# Patient Record
Sex: MALE | Race: WHITE | HISPANIC OR LATINO | Employment: OTHER | URBAN - METROPOLITAN AREA
[De-identification: names, ages, dates, MRNs, and addresses within clinical notes are randomized per-mention and may not be internally consistent; named-entity substitution may affect disease eponyms.]

---

## 2023-11-16 ENCOUNTER — HOSPITAL ENCOUNTER (INPATIENT)
Facility: MEDICAL CENTER | Age: 88
LOS: 2 days | DRG: 444 | End: 2023-11-18
Attending: HOSPITALIST | Admitting: HOSPITALIST
Payer: COMMERCIAL

## 2023-11-16 DIAGNOSIS — R09.02 HYPOXIA: ICD-10-CM

## 2023-11-16 DIAGNOSIS — K85.10 GALLSTONE PANCREATITIS: ICD-10-CM

## 2023-11-16 DIAGNOSIS — K80.50 CHOLEDOCHOLITHIASIS: ICD-10-CM

## 2023-11-16 DIAGNOSIS — J44.9 CHRONIC OBSTRUCTIVE PULMONARY DISEASE, UNSPECIFIED COPD TYPE (HCC): Primary | ICD-10-CM

## 2023-11-16 DIAGNOSIS — K81.0 ACUTE CHOLECYSTITIS: ICD-10-CM

## 2023-11-16 DIAGNOSIS — K81.0 CHOLECYSTITIS, ACUTE: ICD-10-CM

## 2023-11-16 PROCEDURE — 770001 HCHG ROOM/CARE - MED/SURG/GYN PRIV*

## 2023-11-16 PROCEDURE — 99223 1ST HOSP IP/OBS HIGH 75: CPT | Performed by: HOSPITALIST

## 2023-11-16 PROCEDURE — 700105 HCHG RX REV CODE 258: Performed by: HOSPITALIST

## 2023-11-16 PROCEDURE — 94760 N-INVAS EAR/PLS OXIMETRY 1: CPT

## 2023-11-16 PROCEDURE — 700111 HCHG RX REV CODE 636 W/ 250 OVERRIDE (IP): Mod: JZ | Performed by: HOSPITALIST

## 2023-11-16 RX ORDER — ATORVASTATIN CALCIUM 20 MG/1
20 TABLET, FILM COATED ORAL NIGHTLY
COMMUNITY

## 2023-11-16 RX ORDER — HEPARIN SODIUM 1000 [USP'U]/ML
40 INJECTION, SOLUTION INTRAVENOUS; SUBCUTANEOUS PRN
Status: DISCONTINUED | OUTPATIENT
Start: 2023-11-16 | End: 2023-11-17

## 2023-11-16 RX ORDER — ONDANSETRON 4 MG/1
4 TABLET, ORALLY DISINTEGRATING ORAL EVERY 4 HOURS PRN
Status: DISCONTINUED | OUTPATIENT
Start: 2023-11-16 | End: 2023-11-18 | Stop reason: HOSPADM

## 2023-11-16 RX ORDER — BISACODYL 10 MG
10 SUPPOSITORY, RECTAL RECTAL
Status: DISCONTINUED | OUTPATIENT
Start: 2023-11-16 | End: 2023-11-18 | Stop reason: HOSPADM

## 2023-11-16 RX ORDER — ACETAMINOPHEN 325 MG/1
650 TABLET ORAL EVERY 6 HOURS PRN
Status: DISCONTINUED | OUTPATIENT
Start: 2023-11-16 | End: 2023-11-16

## 2023-11-16 RX ORDER — ACETAMINOPHEN 325 MG/1
650 TABLET ORAL EVERY 6 HOURS PRN
Status: DISCONTINUED | OUTPATIENT
Start: 2023-11-16 | End: 2023-11-18 | Stop reason: HOSPADM

## 2023-11-16 RX ORDER — FLUTICASONE PROPIONATE AND SALMETEROL 250; 50 UG/1; UG/1
1 POWDER RESPIRATORY (INHALATION) 2 TIMES DAILY
COMMUNITY

## 2023-11-16 RX ORDER — HYDROMORPHONE HYDROCHLORIDE 1 MG/ML
1 INJECTION, SOLUTION INTRAMUSCULAR; INTRAVENOUS; SUBCUTANEOUS
Status: DISCONTINUED | OUTPATIENT
Start: 2023-11-16 | End: 2023-11-17

## 2023-11-16 RX ORDER — ONDANSETRON 2 MG/ML
4 INJECTION INTRAMUSCULAR; INTRAVENOUS EVERY 4 HOURS PRN
Status: DISCONTINUED | OUTPATIENT
Start: 2023-11-16 | End: 2023-11-16

## 2023-11-16 RX ORDER — HYDROMORPHONE HYDROCHLORIDE 1 MG/ML
0.5 INJECTION, SOLUTION INTRAMUSCULAR; INTRAVENOUS; SUBCUTANEOUS
Status: DISCONTINUED | OUTPATIENT
Start: 2023-11-16 | End: 2023-11-17

## 2023-11-16 RX ORDER — AMOXICILLIN 250 MG
2 CAPSULE ORAL 2 TIMES DAILY
Status: DISCONTINUED | OUTPATIENT
Start: 2023-11-16 | End: 2023-11-18 | Stop reason: HOSPADM

## 2023-11-16 RX ORDER — ENALAPRILAT 1.25 MG/ML
1.25 INJECTION INTRAVENOUS EVERY 6 HOURS PRN
Status: ACTIVE | OUTPATIENT
Start: 2023-11-16 | End: 2023-11-16

## 2023-11-16 RX ORDER — SODIUM CHLORIDE 9 MG/ML
INJECTION, SOLUTION INTRAVENOUS ONCE
Status: COMPLETED | OUTPATIENT
Start: 2023-11-16 | End: 2023-11-16

## 2023-11-16 RX ORDER — ONDANSETRON 2 MG/ML
4 INJECTION INTRAMUSCULAR; INTRAVENOUS EVERY 4 HOURS PRN
Status: DISCONTINUED | OUTPATIENT
Start: 2023-11-16 | End: 2023-11-18 | Stop reason: HOSPADM

## 2023-11-16 RX ORDER — POLYETHYLENE GLYCOL 3350 17 G/17G
1 POWDER, FOR SOLUTION ORAL
Status: DISCONTINUED | OUTPATIENT
Start: 2023-11-16 | End: 2023-11-18 | Stop reason: HOSPADM

## 2023-11-16 RX ORDER — OMEPRAZOLE 20 MG/1
20 CAPSULE, DELAYED RELEASE ORAL 2 TIMES DAILY
Status: ON HOLD | COMMUNITY
End: 2023-11-26 | Stop reason: SDUPTHER

## 2023-11-16 RX ORDER — HEPARIN SODIUM 5000 [USP'U]/100ML
0-30 INJECTION, SOLUTION INTRAVENOUS CONTINUOUS
Status: DISCONTINUED | OUTPATIENT
Start: 2023-11-16 | End: 2023-11-17

## 2023-11-16 RX ORDER — ALBUTEROL SULFATE 90 UG/1
2 AEROSOL, METERED RESPIRATORY (INHALATION)
Status: DISCONTINUED | OUTPATIENT
Start: 2023-11-16 | End: 2023-11-18 | Stop reason: HOSPADM

## 2023-11-16 RX ADMIN — PIPERACILLIN AND TAZOBACTAM 3.38 G: 3; .375 INJECTION, POWDER, FOR SOLUTION INTRAVENOUS at 23:52

## 2023-11-16 RX ADMIN — SODIUM CHLORIDE: 9 INJECTION, SOLUTION INTRAVENOUS at 23:44

## 2023-11-16 ASSESSMENT — COGNITIVE AND FUNCTIONAL STATUS - GENERAL
MOBILITY SCORE: 22
SUGGESTED CMS G CODE MODIFIER DAILY ACTIVITY: CI
CLIMB 3 TO 5 STEPS WITH RAILING: A LITTLE
MOBILITY SCORE: 22
SUGGESTED CMS G CODE MODIFIER MOBILITY: CJ
DAILY ACTIVITIY SCORE: 23
CLIMB 3 TO 5 STEPS WITH RAILING: A LITTLE
HELP NEEDED FOR BATHING: A LITTLE
WALKING IN HOSPITAL ROOM: A LITTLE
SUGGESTED CMS G CODE MODIFIER MOBILITY: CJ
WALKING IN HOSPITAL ROOM: A LITTLE

## 2023-11-16 ASSESSMENT — LIFESTYLE VARIABLES
EVER HAD A DRINK FIRST THING IN THE MORNING TO STEADY YOUR NERVES TO GET RID OF A HANGOVER: NO
TOTAL SCORE: 0
EVER FELT BAD OR GUILTY ABOUT YOUR DRINKING: NO
HOW MANY TIMES IN THE PAST YEAR HAVE YOU HAD 5 OR MORE DRINKS IN A DAY: 0
AVERAGE NUMBER OF DAYS PER WEEK YOU HAVE A DRINK CONTAINING ALCOHOL: 0
HAVE PEOPLE ANNOYED YOU BY CRITICIZING YOUR DRINKING: NO
ALCOHOL_USE: NO
HAVE YOU EVER FELT YOU SHOULD CUT DOWN ON YOUR DRINKING: NO
ON A TYPICAL DAY WHEN YOU DRINK ALCOHOL HOW MANY DRINKS DO YOU HAVE: 0
TOTAL SCORE: 0
CONSUMPTION TOTAL: NEGATIVE
TOTAL SCORE: 0

## 2023-11-16 ASSESSMENT — PATIENT HEALTH QUESTIONNAIRE - PHQ9
2. FEELING DOWN, DEPRESSED, IRRITABLE, OR HOPELESS: NOT AT ALL
1. LITTLE INTEREST OR PLEASURE IN DOING THINGS: NOT AT ALL
SUM OF ALL RESPONSES TO PHQ9 QUESTIONS 1 AND 2: 0
SUM OF ALL RESPONSES TO PHQ9 QUESTIONS 1 AND 2: 0
2. FEELING DOWN, DEPRESSED, IRRITABLE, OR HOPELESS: NOT AT ALL
1. LITTLE INTEREST OR PLEASURE IN DOING THINGS: NOT AT ALL

## 2023-11-16 ASSESSMENT — ENCOUNTER SYMPTOMS
INSOMNIA: 0
VOMITING: 0
FEVER: 0
PALPITATIONS: 0
DEPRESSION: 0
MYALGIAS: 0
SORE THROAT: 0
WEAKNESS: 0
NECK PAIN: 0
BLURRED VISION: 0
NAUSEA: 0
SHORTNESS OF BREATH: 0
BRUISES/BLEEDS EASILY: 0
DIZZINESS: 0
DOUBLE VISION: 0
HEADACHES: 0
COUGH: 0
ABDOMINAL PAIN: 1

## 2023-11-16 ASSESSMENT — COPD QUESTIONNAIRES
DURING THE PAST 4 WEEKS HOW MUCH DID YOU FEEL SHORT OF BREATH: NONE/LITTLE OF THE TIME
HAVE YOU SMOKED AT LEAST 100 CIGARETTES IN YOUR ENTIRE LIFE: NO/DON'T KNOW
COPD SCREENING SCORE: 2
DO YOU EVER COUGH UP ANY MUCUS OR PHLEGM?: NO/ONLY WITH OCCASIONAL COLDS OR INFECTIONS

## 2023-11-16 ASSESSMENT — PAIN DESCRIPTION - PAIN TYPE: TYPE: ACUTE PAIN

## 2023-11-17 ENCOUNTER — APPOINTMENT (OUTPATIENT)
Dept: RADIOLOGY | Facility: MEDICAL CENTER | Age: 88
DRG: 444 | End: 2023-11-17
Attending: INTERNAL MEDICINE
Payer: COMMERCIAL

## 2023-11-17 ENCOUNTER — ANESTHESIA (OUTPATIENT)
Dept: SURGERY | Facility: MEDICAL CENTER | Age: 88
DRG: 444 | End: 2023-11-17
Payer: COMMERCIAL

## 2023-11-17 ENCOUNTER — ANESTHESIA EVENT (OUTPATIENT)
Dept: SURGERY | Facility: MEDICAL CENTER | Age: 88
DRG: 444 | End: 2023-11-17
Payer: COMMERCIAL

## 2023-11-17 PROBLEM — J44.9 COPD (CHRONIC OBSTRUCTIVE PULMONARY DISEASE) (HCC): Status: ACTIVE | Noted: 2023-11-17

## 2023-11-17 PROBLEM — K80.50 CHOLEDOCHOLITHIASIS: Status: ACTIVE | Noted: 2023-11-17

## 2023-11-17 PROBLEM — K85.10 GALLSTONE PANCREATITIS: Status: ACTIVE | Noted: 2023-11-17

## 2023-11-17 LAB
ALBUMIN SERPL BCP-MCNC: 3.3 G/DL (ref 3.2–4.9)
ALBUMIN/GLOB SERPL: 1.3 G/DL
ALP SERPL-CCNC: 115 U/L (ref 30–99)
ALT SERPL-CCNC: 60 U/L (ref 2–50)
ANION GAP SERPL CALC-SCNC: 9 MMOL/L (ref 7–16)
APTT PPP: 31.3 SEC (ref 24.7–36)
APTT PPP: 37.7 SEC (ref 24.7–36)
APTT PPP: 87.6 SEC (ref 24.7–36)
AST SERPL-CCNC: 71 U/L (ref 12–45)
BILIRUB SERPL-MCNC: 1.6 MG/DL (ref 0.1–1.5)
BUN SERPL-MCNC: 15 MG/DL (ref 8–22)
CALCIUM ALBUM COR SERPL-MCNC: 9.1 MG/DL (ref 8.5–10.5)
CALCIUM SERPL-MCNC: 8.5 MG/DL (ref 8.4–10.2)
CHLORIDE SERPL-SCNC: 106 MMOL/L (ref 96–112)
CO2 SERPL-SCNC: 24 MMOL/L (ref 20–33)
CREAT SERPL-MCNC: 0.79 MG/DL (ref 0.5–1.4)
ERYTHROCYTE [DISTWIDTH] IN BLOOD BY AUTOMATED COUNT: 52.4 FL (ref 35.9–50)
GFR SERPLBLD CREATININE-BSD FMLA CKD-EPI: 83 ML/MIN/1.73 M 2
GLOBULIN SER CALC-MCNC: 2.5 G/DL (ref 1.9–3.5)
GLUCOSE SERPL-MCNC: 91 MG/DL (ref 65–99)
HCT VFR BLD AUTO: 38.5 % (ref 42–52)
HGB BLD-MCNC: 12.7 G/DL (ref 14–18)
INR PPP: 1.24 (ref 0.87–1.13)
LIPASE SERPL-CCNC: 23 U/L (ref 11–82)
LIPASE SERPL-CCNC: 29 U/L (ref 11–82)
LIPASE SERPL-CCNC: 58 U/L (ref 11–82)
MCH RBC QN AUTO: 32 PG (ref 27–33)
MCHC RBC AUTO-ENTMCNC: 33 G/DL (ref 32.3–36.5)
MCV RBC AUTO: 97 FL (ref 81.4–97.8)
NT-PROBNP SERPL IA-MCNC: 1023 PG/ML (ref 0–125)
NT-PROBNP SERPL IA-MCNC: 1028 PG/ML (ref 0–125)
PLATELET # BLD AUTO: 210 K/UL (ref 164–446)
PMV BLD AUTO: 9.6 FL (ref 9–12.9)
POTASSIUM SERPL-SCNC: 3.8 MMOL/L (ref 3.6–5.5)
PROT SERPL-MCNC: 5.8 G/DL (ref 6–8.2)
PROTHROMBIN TIME: 16.2 SEC (ref 12–14.6)
RBC # BLD AUTO: 3.97 M/UL (ref 4.7–6.1)
SODIUM SERPL-SCNC: 139 MMOL/L (ref 135–145)
TROPONIN T SERPL-MCNC: 20 NG/L (ref 6–19)
TROPONIN T SERPL-MCNC: 20 NG/L (ref 6–19)
TROPONIN T SERPL-MCNC: 21 NG/L (ref 6–19)
UFH PPP CHRO-ACNC: >1.1 IU/ML
WBC # BLD AUTO: 9.6 K/UL (ref 4.8–10.8)

## 2023-11-17 PROCEDURE — 36415 COLL VENOUS BLD VENIPUNCTURE: CPT

## 2023-11-17 PROCEDURE — 94640 AIRWAY INHALATION TREATMENT: CPT

## 2023-11-17 PROCEDURE — 700102 HCHG RX REV CODE 250 W/ 637 OVERRIDE(OP): Performed by: HOSPITALIST

## 2023-11-17 PROCEDURE — C1769 GUIDE WIRE: HCPCS | Performed by: INTERNAL MEDICINE

## 2023-11-17 PROCEDURE — 160009 HCHG ANES TIME/MIN: Performed by: INTERNAL MEDICINE

## 2023-11-17 PROCEDURE — 700105 HCHG RX REV CODE 258: Performed by: HOSPITALIST

## 2023-11-17 PROCEDURE — 700105 HCHG RX REV CODE 258: Performed by: INTERNAL MEDICINE

## 2023-11-17 PROCEDURE — 700101 HCHG RX REV CODE 250: Performed by: STUDENT IN AN ORGANIZED HEALTH CARE EDUCATION/TRAINING PROGRAM

## 2023-11-17 PROCEDURE — 110371 HCHG SHELL REV 272: Performed by: INTERNAL MEDICINE

## 2023-11-17 PROCEDURE — 0FC98ZZ EXTIRPATION OF MATTER FROM COMMON BILE DUCT, VIA NATURAL OR ARTIFICIAL OPENING ENDOSCOPIC: ICD-10-PCS | Performed by: INTERNAL MEDICINE

## 2023-11-17 PROCEDURE — 83690 ASSAY OF LIPASE: CPT | Mod: 91

## 2023-11-17 PROCEDURE — 700111 HCHG RX REV CODE 636 W/ 250 OVERRIDE (IP): Performed by: STUDENT IN AN ORGANIZED HEALTH CARE EDUCATION/TRAINING PROGRAM

## 2023-11-17 PROCEDURE — 160203 HCHG ENDO MINUTES - 1ST 30 MINS LEVEL 4: Performed by: INTERNAL MEDICINE

## 2023-11-17 PROCEDURE — 94760 N-INVAS EAR/PLS OXIMETRY 1: CPT

## 2023-11-17 PROCEDURE — 85730 THROMBOPLASTIN TIME PARTIAL: CPT | Mod: 91

## 2023-11-17 PROCEDURE — 700111 HCHG RX REV CODE 636 W/ 250 OVERRIDE (IP): Mod: JZ | Performed by: HOSPITALIST

## 2023-11-17 PROCEDURE — 160035 HCHG PACU - 1ST 60 MINS PHASE I: Performed by: INTERNAL MEDICINE

## 2023-11-17 PROCEDURE — 160002 HCHG RECOVERY MINUTES (STAT): Performed by: INTERNAL MEDICINE

## 2023-11-17 PROCEDURE — 85610 PROTHROMBIN TIME: CPT

## 2023-11-17 PROCEDURE — 80053 COMPREHEN METABOLIC PANEL: CPT

## 2023-11-17 PROCEDURE — A9270 NON-COVERED ITEM OR SERVICE: HCPCS | Performed by: HOSPITALIST

## 2023-11-17 PROCEDURE — 160048 HCHG OR STATISTICAL LEVEL 1-5: Performed by: INTERNAL MEDICINE

## 2023-11-17 PROCEDURE — 99233 SBSQ HOSP IP/OBS HIGH 50: CPT | Performed by: INTERNAL MEDICINE

## 2023-11-17 PROCEDURE — 85027 COMPLETE CBC AUTOMATED: CPT

## 2023-11-17 PROCEDURE — 83880 ASSAY OF NATRIURETIC PEPTIDE: CPT | Mod: 91

## 2023-11-17 PROCEDURE — 85520 HEPARIN ASSAY: CPT

## 2023-11-17 PROCEDURE — 0F798ZZ DILATION OF COMMON BILE DUCT, VIA NATURAL OR ARTIFICIAL OPENING ENDOSCOPIC: ICD-10-PCS | Performed by: INTERNAL MEDICINE

## 2023-11-17 PROCEDURE — 84484 ASSAY OF TROPONIN QUANT: CPT | Mod: 91

## 2023-11-17 PROCEDURE — 770020 HCHG ROOM/CARE - TELE (206)

## 2023-11-17 PROCEDURE — 160208 HCHG ENDO MINUTES - EA ADDL 1 MIN LEVEL 4: Performed by: INTERNAL MEDICINE

## 2023-11-17 RX ORDER — HEPARIN SODIUM 5000 [USP'U]/100ML
INJECTION, SOLUTION INTRAVENOUS CONTINUOUS
Status: DISCONTINUED | OUTPATIENT
Start: 2023-11-17 | End: 2023-11-18

## 2023-11-17 RX ORDER — ROCURONIUM BROMIDE 10 MG/ML
INJECTION, SOLUTION INTRAVENOUS PRN
Status: DISCONTINUED | OUTPATIENT
Start: 2023-11-17 | End: 2023-11-17 | Stop reason: SURG

## 2023-11-17 RX ORDER — HYDRALAZINE HYDROCHLORIDE 20 MG/ML
5 INJECTION INTRAMUSCULAR; INTRAVENOUS ONCE
Status: DISCONTINUED | OUTPATIENT
Start: 2023-11-17 | End: 2023-11-17 | Stop reason: HOSPADM

## 2023-11-17 RX ORDER — SODIUM CHLORIDE, SODIUM LACTATE, POTASSIUM CHLORIDE, CALCIUM CHLORIDE 600; 310; 30; 20 MG/100ML; MG/100ML; MG/100ML; MG/100ML
INJECTION, SOLUTION INTRAVENOUS CONTINUOUS
Status: DISCONTINUED | OUTPATIENT
Start: 2023-11-17 | End: 2023-11-17 | Stop reason: HOSPADM

## 2023-11-17 RX ORDER — HALOPERIDOL 5 MG/ML
1 INJECTION INTRAMUSCULAR
Status: DISCONTINUED | OUTPATIENT
Start: 2023-11-17 | End: 2023-11-17 | Stop reason: HOSPADM

## 2023-11-17 RX ORDER — HEPARIN SODIUM 1000 [USP'U]/ML
2600 INJECTION, SOLUTION INTRAVENOUS; SUBCUTANEOUS PRN
Status: DISCONTINUED | OUTPATIENT
Start: 2023-11-17 | End: 2023-11-18

## 2023-11-17 RX ORDER — ONDANSETRON 2 MG/ML
4 INJECTION INTRAMUSCULAR; INTRAVENOUS
Status: DISCONTINUED | OUTPATIENT
Start: 2023-11-17 | End: 2023-11-17 | Stop reason: HOSPADM

## 2023-11-17 RX ORDER — IPRATROPIUM BROMIDE 17 UG/1
2 AEROSOL, METERED RESPIRATORY (INHALATION) EVERY 6 HOURS PRN
COMMUNITY

## 2023-11-17 RX ORDER — LIDOCAINE HYDROCHLORIDE 20 MG/ML
INJECTION, SOLUTION EPIDURAL; INFILTRATION; INTRACAUDAL; PERINEURAL PRN
Status: DISCONTINUED | OUTPATIENT
Start: 2023-11-17 | End: 2023-11-17 | Stop reason: SURG

## 2023-11-17 RX ORDER — MORPHINE SULFATE 4 MG/ML
4 INJECTION INTRAVENOUS
Status: DISCONTINUED | OUTPATIENT
Start: 2023-11-17 | End: 2023-11-18 | Stop reason: HOSPADM

## 2023-11-17 RX ORDER — MORPHINE SULFATE 4 MG/ML
2 INJECTION INTRAVENOUS
Status: DISCONTINUED | OUTPATIENT
Start: 2023-11-17 | End: 2023-11-18 | Stop reason: HOSPADM

## 2023-11-17 RX ORDER — SODIUM CHLORIDE, SODIUM LACTATE, POTASSIUM CHLORIDE, CALCIUM CHLORIDE 600; 310; 30; 20 MG/100ML; MG/100ML; MG/100ML; MG/100ML
INJECTION, SOLUTION INTRAVENOUS CONTINUOUS
Status: ACTIVE | OUTPATIENT
Start: 2023-11-17 | End: 2023-11-17

## 2023-11-17 RX ADMIN — SUGAMMADEX 200 MG: 100 INJECTION, SOLUTION INTRAVENOUS at 17:39

## 2023-11-17 RX ADMIN — MOMETASONE FUROATE AND FORMOTEROL FUMARATE DIHYDRATE 2 PUFF: 100; 5 AEROSOL RESPIRATORY (INHALATION) at 10:27

## 2023-11-17 RX ADMIN — LIDOCAINE HYDROCHLORIDE 60 MG: 20 INJECTION, SOLUTION EPIDURAL; INFILTRATION; INTRACAUDAL at 16:59

## 2023-11-17 RX ADMIN — PIPERACILLIN AND TAZOBACTAM 3.38 G: 3; .375 INJECTION, POWDER, FOR SOLUTION INTRAVENOUS at 20:45

## 2023-11-17 RX ADMIN — SODIUM CHLORIDE, POTASSIUM CHLORIDE, SODIUM LACTATE AND CALCIUM CHLORIDE: 600; 310; 30; 20 INJECTION, SOLUTION INTRAVENOUS at 16:54

## 2023-11-17 RX ADMIN — PIPERACILLIN AND TAZOBACTAM 3.38 G: 3; .375 INJECTION, POWDER, FOR SOLUTION INTRAVENOUS at 03:12

## 2023-11-17 RX ADMIN — MOMETASONE FUROATE AND FORMOTEROL FUMARATE DIHYDRATE 2 PUFF: 100; 5 AEROSOL RESPIRATORY (INHALATION) at 19:23

## 2023-11-17 RX ADMIN — HYDROMORPHONE HYDROCHLORIDE 1 MG: 1 INJECTION, SOLUTION INTRAMUSCULAR; INTRAVENOUS; SUBCUTANEOUS at 09:51

## 2023-11-17 RX ADMIN — ROCURONIUM BROMIDE 40 MG: 50 INJECTION, SOLUTION INTRAVENOUS at 17:00

## 2023-11-17 RX ADMIN — PIPERACILLIN AND TAZOBACTAM 3.38 G: 3; .375 INJECTION, POWDER, FOR SOLUTION INTRAVENOUS at 09:55

## 2023-11-17 RX ADMIN — HEPARIN SODIUM 1050 UNITS/HR: 5000 INJECTION, SOLUTION INTRAVENOUS at 01:39

## 2023-11-17 RX ADMIN — PROPOFOL 70 MG: 10 INJECTION, EMULSION INTRAVENOUS at 17:00

## 2023-11-17 ASSESSMENT — PAIN DESCRIPTION - PAIN TYPE
TYPE: SURGICAL PAIN
TYPE: ACUTE PAIN
TYPE: ACUTE PAIN;SURGICAL PAIN
TYPE: ACUTE PAIN

## 2023-11-17 ASSESSMENT — ENCOUNTER SYMPTOMS
WEAKNESS: 1
RESPIRATORY NEGATIVE: 1
CHILLS: 1
EYES NEGATIVE: 1
MUSCULOSKELETAL NEGATIVE: 1
GASTROINTESTINAL NEGATIVE: 1
PSYCHIATRIC NEGATIVE: 1
NEUROLOGICAL NEGATIVE: 1
NAUSEA: 1
CARDIOVASCULAR NEGATIVE: 1
CONSTITUTIONAL NEGATIVE: 1
VOMITING: 1
ABDOMINAL PAIN: 1

## 2023-11-17 ASSESSMENT — FIBROSIS 4 INDEX: FIB4 SCORE: 4.02

## 2023-11-17 NOTE — H&P
Hospital Medicine History & Physical Note    Date of Service  11/16/2023    Primary Care Physician  No primary care provider on file.    Consultants  GI    Specialist Names: I personally discussed this case with Dr. Wright.    Code Status  Full Code    Chief Complaint  Direct Admission from [facility] for  Acute Choledocholithiasis and outside facility MRCP showing multiple  Bile Duct Stones. Transferred for need of GI consult and possible ERCP.     History of Presenting Illness  Lewis Farooq is a 89 y.o. male, h/o PE 2 months ago, on Eliquis,  who is coming as a Direct Admission from [facility] for  Acute Choledocholithiasis and outside facility MRCP showing multiple  Bile Duct Stones. Transferred for need of GI consult and possible ERCP on 11/16/2023.      Additionally, patient was hypoxic and is now on 2 L of supplemental oxygen via nasal cannula.  He has history of COPD, but denies wheezing.  He also denies cough or fever.  Congestive heart failure (unknown EF), BPH.    Patient reports his abdominal pain started earlier this morning.  Pain is on the right upper quadrant and associated with 6 episodes of nonbloody emesis.      Medications/ therapy given prior transfer:  Zosyn 3.375 g IV (15:57)  LR 1 L   Zofran 4 mg  Fentanyl 25 mcg IV    I discussed the plan of care with patient and received signed out from Hospitalist who accepted patient, Dr. Harvey and GI, Dr. Wright.  .    Review of Systems  Review of Systems   Constitutional:  Positive for malaise/fatigue. Negative for fever.   HENT:  Negative for congestion and sore throat.    Eyes:  Negative for blurred vision and double vision.   Respiratory:  Negative for cough and shortness of breath.    Cardiovascular:  Negative for chest pain and palpitations.   Gastrointestinal:  Positive for abdominal pain. Negative for nausea and vomiting.   Genitourinary:  Negative for dysuria and urgency.   Musculoskeletal:  Negative for  myalgias and neck pain.   Skin:  Negative for itching and rash.   Neurological:  Negative for dizziness, weakness and headaches.   Endo/Heme/Allergies:  Does not bruise/bleed easily.   Psychiatric/Behavioral:  Negative for depression. The patient does not have insomnia.        Past Medical History  COPD, congestive heart failure, BPH and recently, couple of months ago diagnosed with PE and on Eliquis    Surgical History  He denies prior surgical history.    Family History  Reviewed and not pertinent  Family history reviewed with patient. There is no family history that is pertinent to the chief complaint.     Social History     Patient denies smoking.  He also denies daily drink of alcohol and recreational drug use.    Allergies  NKDA    Medications  None   Eliquis 5 mg BID  Singulair  Tamsulosine  Omeprazole  Advair  Lasix 29 mg PRN      Physical Exam  Temp:  [36.5 °C (97.7 °F)] 36.5 °C (97.7 °F)  Pulse:  [57] 57  Resp:  [17] 17  BP: (135)/(56) 135/56  SpO2:  [94 %] 94 %  Blood Pressure : 135/56   Temperature: 36.5 °C (97.7 °F)   Pulse: (!) 57   Respiration: 17   Pulse Oximetry: 94 %       Physical Exam  Constitutional:       Appearance: Normal appearance.   HENT:      Head: Normocephalic and atraumatic.      Nose: Nose normal.      Mouth/Throat:      Mouth: Mucous membranes are moist.      Pharynx: Oropharynx is clear.   Eyes:      Extraocular Movements: Extraocular movements intact.      Pupils: Pupils are equal, round, and reactive to light.   Cardiovascular:      Rate and Rhythm: Normal rate and regular rhythm.      Pulses: Normal pulses.      Heart sounds: Normal heart sounds.   Pulmonary:      Effort: Pulmonary effort is normal.      Breath sounds: Normal breath sounds.   Abdominal:      General: Abdomen is flat. Bowel sounds are normal.      Palpations: Abdomen is soft.   Musculoskeletal:      Cervical back: Normal range of motion and neck supple.   Skin:     General: Skin is warm and dry.   Neurological:       General: No focal deficit present.      Mental Status: He is alert and oriented to person, place, and time.   Psychiatric:         Mood and Affect: Mood normal.         Behavior: Behavior normal.         Laboratory:      Outside Facility Laboratory:   CMP: Na 137, K 4.0, BUN 28, Creatinine 0.89, , ALT 57, Total Bilirubin 2.3  CBC: WBC 10.3, Hgb 14.5, Platelets 238  BNP 84  Lipase 785  Troponin 9.6  COVID PCR: Negative.     Imaging:  No orders to display     CT Chest/ Abd/ Pelvis  Impression:  Sludge versus layering small non radiodense stone in the gallbladder. Ultrasound may be helpful to confirm or exclude.  No Biliary Ductal Dilation or evidence of choledocholithiasis  3. Enlarged Pulmonary Artery and prominent right ventricle, suspect for  pulmonary Hypertension  4. Mild mosaic pattern of the lungs, suspect trapping  5. Stable semisolid nodule right middle lobe    MRCP:  Impression:  1.Distended gallbladder containing small burden dependent sludge and mild dilation of common bile duct of 10 mm burden dependent  also demonstrating small depemdemt stones in the mid to distal CBD measuring 3mm to 4 mm in size.  2. Negative for obvious pancreatic phlegmon, peripancreatic inflammatory change or pseudocyst.     X-Ray:     EKG:  I have personally reviewed the images and compared with prior images. and My impression is: (outside facility EKG done earlier today): Sinus rhythm at 85 bpm, with RBBB and frequent PVC's. No acute ST elevation or depression.     Assessment/Plan:  Justification for Admission Status  I anticipate this patient will require at least two midnights for appropriate medical management, necessitating inpatient admission because 89-year-old male, coming with acute choledocholithiasis.  He has history of PE and is on Eliquis.  In need of GI evaluation and possible ERCP.      * Choledocholithiasis  Assessment & Plan  -Inpatient status on medical floor with remote cardiac monitoring.  -Patient is  coming as a direct admission from [facility].  Acute onset of abdominal pain and nonbloody emesis.  -CT showing gallbladder sludge but no biliary duct dilation.  -MRCP demonstrated distended gallbladder containing small burden dependent sludge and mild common bile duct dilation of 10 mm and stones to the mid to distal common bile duct measuring 3 to 4 mm.  -Given concern of possible component of acute cholecystitis patient was also started on Zosyn which we will continue.  -He has elevated LFTs and total bilirubin of 2.3 with also underlying pancreatitis and initial lipase of 785.  -Strict n.p.o. with low IV fluid hydration until I can fully assess EF.  -I appreciate GI consult and recommendations.  I discussed the case with Dr. Wright.    Hypoxia  Assessment & Plan  -Hypoxic to 84 % at outside facility, now on 2 L O2 via NC.   -Underlying history of COPD but not wheezing at this time.  -Recent history of PE on Eliquis and compliant with medication.  Noticed that CT chest  -Showed enlarged pulmonary artery and prominent right ventricle suspect for pulmonary hypertension.  -COVID PCR negative at outside facility.  -Patient will need to likely need procedure.  Given higher risk for surgery and possibly decompensating underlying cardiopulmonary process, adding echocardiogram to further evaluate and obtain an EF.  -Troponin and proBNP negative at outside facility but I will repeat.  Patient is not currently having any chest pain.  EKG showing right bundle branch block and frequent PVCs but no acute ischemia or arrhythmias.  Monitor with remote cardiac unit at this point.  -I am starting him on heparin infusion given that Eliquis needs to be held for procedure.  I have discussed all of this with GI, Dr. Wright.    Gallstone pancreatitis  Assessment & Plan  -Lipase at outside facility was 785.  -Patient will be started on mild IV hydration with normal saline at 83 mL/h.  Avoid fluid overload as I still  do not know his EF but need to treat acute pancreatitis.  -Daily lipase added.  -Pain control and antiemetics as needed.  -MRCP was negative for obvious pancreatic acute findings.    COPD (chronic obstructive pulmonary disease) (HCC)  Assessment & Plan  -Without exacerbation.  -Continue Advair.        VTE prophylaxis: SCDs/TEDs

## 2023-11-17 NOTE — PROGRESS NOTES
[facility] Medicine Daily Progress Note    Date of Service  11/17/2023    Chief Complaint  Lewis Farooq is a 89 y.o. male transferred from [facility] on 11/16/2023 where he presented with acute right upper quadrant abdominal pain, nausea, and vomiting.     CT showing gallbladder sludge but no biliary duct dilation. MRCP showed distended gallbladder containing sludge and mild common bile duct dilation of 10 mm and stones to the mid to distal common bile duct measuring 3 to 4 mm.  He was started on Zosyn and transferred to [facility] for GI consult and possible ERCP.    He was diagnosed with pulmonary embolism 2 months ago and is on Eliquis.  He has a history of COPD, CHF (unknown EF), and BPH.    Hospital Course  Case was discussed with GI (Dr. Wright).     Interval Problem Update  Evaluated by GI.  Planned for ERCP today.  Lipase is 23. Afebrile and hemodynamically stable. Will consult General Surgery. Saturating 92% on 2 L/min nasal cannula.    I have discussed this patient's plan of care and discharge plan at IDT rounds today with Case Management, Nursing, Nursing leadership, and other members of the IDT team.    Consultants/Specialty  GI    Code Status  Full Code    Disposition  The patient is not medically cleared for discharge to home or a post-acute facility.  Anticipate discharge to: home with close outpatient follow-up    I have placed the appropriate orders for post-discharge needs.    Review of Systems  Review of Systems   Constitutional: Negative.    HENT: Negative.     Eyes: Negative.    Respiratory: Negative.     Cardiovascular: Negative.    Gastrointestinal: Negative.    Genitourinary: Negative.    Musculoskeletal: Negative.    Skin: Negative.    Neurological: Negative.    Endo/Heme/Allergies: Negative.    Psychiatric/Behavioral: Negative.          Physical Exam  Temp:  [36.2 °C (97.1 °F)-36.5 °C (97.7 °F)] 36.4 °C (97.5 °F)  Pulse:  [52-60] 60  Resp:  [15-17] 15  BP:  (132-147)/(56-65) 147/59  SpO2:  [90 %-94 %] 92 %    Physical Exam  Constitutional:       General: He is not in acute distress.  HENT:      Head: Normocephalic.      Nose: Nose normal.   Eyes:      Pupils: Pupils are equal, round, and reactive to light.   Cardiovascular:      Rate and Rhythm: Normal rate.   Pulmonary:      Effort: Pulmonary effort is normal.   Abdominal:      Palpations: Abdomen is soft.   Musculoskeletal:      Cervical back: Normal range of motion.      Right lower leg: No edema.      Left lower leg: No edema.   Skin:     General: Skin is warm.   Neurological:      General: No focal deficit present.      Mental Status: He is alert.   Psychiatric:         Mood and Affect: Mood normal.         Fluids    Intake/Output Summary (Last 24 hours) at 11/17/2023 1318  Last data filed at 11/17/2023 1300  Gross per 24 hour   Intake 0 ml   Output --   Net 0 ml       Laboratory  Recent Labs     11/17/23  0712   WBC 9.6   RBC 3.97*   HEMOGLOBIN 12.7*   HEMATOCRIT 38.5*   MCV 97.0   MCH 32.0   MCHC 33.0   RDW 52.4*   PLATELETCT 210   MPV 9.6     Recent Labs     11/17/23  0734   SODIUM 139   POTASSIUM 3.8   CHLORIDE 106   CO2 24   GLUCOSE 91   BUN 15   CREATININE 0.79   CALCIUM 8.5     Recent Labs     11/17/23  0009 11/17/23  0734   APTT 31.3 87.6*   INR 1.24*  --                Imaging  DX-PORTABLE FLUOROSCOPY < 1 HOUR    (Results Pending)        Assessment/Plan  * Choledocholithiasis  Assessment & Plan  Transferred from [facility] on 11/16/2023 where he presented with acute right upper quadrant abdominal pain, nausea, and vomiting. CT showing gallbladder sludge but no biliary duct dilation. MRCP showed distended gallbladder containing sludge and mild common bile duct dilation of 10 mm and stones to the mid to distal common bile duct measuring 3 to 4 mm. He was started on Zosyn and transferred to [facility] for GI consult/ERCP. Planned for ERCP today. Will consult General Surgery    Gallstone  pancreatitis  Assessment & Plan  Lipase was 785 at outside facility, improved to 23.  MRCP showed distended gallbladder containing sludge and mild common bile duct dilation of 10 mm and stones to the mid to distal common bile duct measuring 3 to 4 mm    COPD (chronic obstructive pulmonary disease) (HCC)  Assessment & Plan  Stable. Continue as needed inhalers.    Hypoxia  Assessment & Plan  Patient has a history of COPD.  Currently saturating 92% on 2 L/min nasal cannula.  Does not use oxygen at home.  He has a history of pulm embolism, and is on Eliquis.  Eliquis was switched to IV heparin on admission.  Patient will need outpatient pulmonology follow-up         VTE prophylaxis: SCDs

## 2023-11-17 NOTE — ASSESSMENT & PLAN NOTE
Transferred from [facility] on 11/16/2023 where he presented with acute right upper quadrant abdominal pain, nausea, and vomiting. CT showing gallbladder sludge but no biliary duct dilation. MRCP showed distended gallbladder containing sludge and mild common bile duct dilation of 10 mm and stones to the mid to distal common bile duct measuring 3 to 4 mm. He was started on Zosyn and transferred to [facility] for GI consult/ERCP. Planned for ERCP today. Will consult General Surgery

## 2023-11-17 NOTE — ASSESSMENT & PLAN NOTE
Patient has a history of COPD.  Currently saturating 92% on 2 L/min nasal cannula.  Does not use oxygen at home.  He has a history of pulm embolism, and is on Eliquis.  Eliquis was switched to IV heparin on admission.  Patient will need outpatient pulmonology follow-up

## 2023-11-17 NOTE — CARE PLAN
The patient is Stable - Low risk of patient condition declining or worsening    Shift Goals  Clinical Goals: Pt's pain will be tolerable at end of shift; maintain strict NPO; continue on IV fluids.  Patient Goals: Sleep and pain mgt.  Family Goals: Updates on POC.    Progress made toward(s) clinical / shift goals:    Pt's weight based heparin drip started; continued IV fluids and strict NPO; pt able to ambulate to bathroom multiple times hand held since pt refused walker; daughter at bedside the whole time. Bed alarms on.     Patient is not progressing towards the following goals:

## 2023-11-17 NOTE — CONSULTS
Gastroenterology Consult Note:    HAYDEN Crane  Date & Time note created:    11/17/2023   11:05 AM     Referring MD:  Dr. Howell    Patient ID:  Name:             Lewis Ivy     YOB: 1930  Age:                 89 y.o.  male   MRN:               [id number]                                                             Reason for Consult:      Epigastric abdominal pain  N/V    History of Present Illness:    This is a 89 yo Irish speaking male, his daughter, Gerda is the , [facility], recent PE started on Eliquis 10/2023 who was seen in consultation for sudden onset of epigastric/RUQ abdominal pain, multiple episodes of vomiting starting yesterday. Complaints of chills but no fever. He was seen at [facility] for these symptoms. He was found to be hypoxic O2 sats 84% now on 2 L of oxygen. Outside labs: TB: 2.3. Lipase: 785. MRCP: distended gallbladder containing small burden dependent sludge and mild common bile duct dilation of 10 mm with stones to the mid to distal common bile duct measuring 3-4 mm. Last dose of Eliquis was yesterday morning. Transferred to [facility] for possible ERCP. He was started on Zosyn IV and Heparin gtt. Repeat Labs this morning: AST: 71. ALT: 60. ALP: 115. TB: 1.6. Lipase: 29. INR: 1.24. NT-proBNP: 1028. Troponin: 21.     Review of Systems:      Review of Systems   Constitutional:  Positive for chills and malaise/fatigue.   HENT: Negative.     Eyes: Negative.    Respiratory: Negative.     Cardiovascular: Negative.    Gastrointestinal:  Positive for abdominal pain, nausea and vomiting.   Genitourinary: Negative.    Musculoskeletal: Negative.    Skin: Negative.    Neurological:  Positive for weakness.   Psychiatric/Behavioral: Negative.               Physical Exam:  Vitals/ General Appearance:   Weight/BMI: Body mass index is 24.86 kg/m².    Vitals:    11/17/23 0436 11/17/23 0910 11/17/23 1000 11/17/23 1028   BP: (!) 145/63 (!) 146/65  139/62    Pulse: (!) 56 (!) 52 (!) 54 (!) 53   Resp: 17 15 16 17   Temp: 36.2 °C (97.1 °F) 36.2 °C (97.1 °F)     TempSrc: Temporal Temporal     SpO2: 92% 92% 91% 91%   Weight:       Height:         Oxygen Therapy:  Pulse Oximetry: 91 %, O2 (LPM): 2, O2 Delivery Device: Nasal Cannula    Physical Exam  Vitals reviewed.   Constitutional:       Appearance: He is obese. He is ill-appearing.   HENT:      Head: Normocephalic and atraumatic.      Mouth/Throat:      Mouth: Mucous membranes are dry.      Comments: Missing teeth  Eyes:      Extraocular Movements: Extraocular movements intact.      Pupils: Pupils are equal, round, and reactive to light.   Cardiovascular:      Rate and Rhythm: Regular rhythm. Bradycardia present.      Pulses: Normal pulses.      Heart sounds: Normal heart sounds.   Pulmonary:      Effort: Pulmonary effort is normal.      Breath sounds: Normal breath sounds.   Abdominal:      General: Bowel sounds are normal.      Palpations: Abdomen is soft.      Tenderness: There is abdominal tenderness. There is no guarding.   Musculoskeletal:         General: Normal range of motion.      Cervical back: Normal range of motion and neck supple.   Skin:     General: Skin is warm and dry.      Capillary Refill: Capillary refill takes 2 to 3 seconds.   Neurological:      General: No focal deficit present.      Mental Status: He is alert and oriented to person, place, and time.   Psychiatric:         Mood and Affect: Mood normal.         Behavior: Behavior normal.         Thought Content: Thought content normal.         Judgment: Judgment normal.         Past Medical History:   History reviewed. No pertinent past medical history.    Past Surgical History:  History reviewed. No pertinent surgical history.    Hospital Medications:    Current Facility-Administered Medications:     heparin injection 2,600 Units, 2,600 Units, Intravenous, PRN **AND** heparin infusion 25,000 Units in 500 mL 0.45% NACL, , Intravenous,  Continuous, Last Rate: 21 mL/hr at 11/17/23 0900, 1,050 Units/hr at 11/17/23 0900 **AND** Protocol 440 Heparin Weight Based DO NOT GIVE ANY HEPARIN BOLUS TO STROKE PATIENT, , , CONTINUOUS **AND** Protocol 440 Heparin Weight Based Discontinue Enoxaparin (Lovenox), Dabigatran (Pradaxa), Rivaroxaban (Xarelto), Apixaban (Eliquis), Edoxaban (Savaysa, Lixiana), Fondaparinux (Arixtra) and Argatroban prior to heparin administration, , , Once **AND** Protocol 440 Heparin Weight Based Draw baseline aPTT, PT, and platelet count if not already done, , , CONTINUOUS **AND** Protocol 440 Heparin Weight Based Draw aPTT 6 hours after beginning infusion. , , , CONTINUOUS **AND** Protocol 440 Heparin Weight Based Record Patient Data, , , CONTINUOUS **AND** Protocol 440 Heparin Weight Based INSTRUCTIONS, , , CONTINUOUS **AND** Protocol 440 Heparin Weight Based Review aPTT results 6 hours after infusion is begun as detailed, , , CONTINUOUS **AND** Protocol 440 Heparin Weight Based Draw Platelet count every three days. Contact MD if platelet is 50% lower than baseline count., , , CONTINUOUS **AND** Protocol 440 Heparin Weight Based Adjust heparin to maintain aPTT between 55-96 sec, , , CONTINUOUS **AND** Protocol 440 Heparin Weight Based Order aPTT 6 hours after any rate change or hold until aPTT is therapeutic (55-96 seconds), , , CONTINUOUS **AND** Protocol 440 Heparin Weight Based Documentation and verification, , , CONTINUOUS, Shayy Velarde M.D.    mometasone-formoterol (Dulera) 100-5 MCG/ACT inhaler 2 Puff, 2 Puff, Inhalation, BID (RT), Shayy Velarde M.D., 2 Puff at 11/17/23 1027    acetaminophen (Tylenol) tablet 650 mg, 650 mg, Oral, Q6HRS PRN, Shayy Velarde M.D.    ondansetron (Zofran) syringe/vial injection 4 mg, 4 mg, Intravenous, Q4HRS PRN, KAYLA RocaD.    ondansetron (Zofran ODT) dispertab 4 mg, 4 mg, Oral, Q4HRS PRN, Shayy Velarde M.D.    senna-docusate (Pericolace Or  Senokot S) 8.6-50 MG per tablet 2 Tablet, 2 Tablet, Oral, BID **AND** polyethylene glycol/lytes (Miralax) PACKET 1 Packet, 1 Packet, Oral, QDAY PRN **AND** magnesium hydroxide (Milk Of Magnesia) suspension 30 mL, 30 mL, Oral, QDAY PRN **AND** bisacodyl (Dulcolax) suppository 10 mg, 10 mg, Rectal, QDAY PRN, Shayy Velarde M.D.    [COMPLETED] piperacillin-tazobactam (Zosyn) 3.375 g in  mL IVPB, 3.375 g, Intravenous, Once, Stopped at 11/17/23 0022 **AND** piperacillin-tazobactam (Zosyn) 3.375 g in  mL IVPB, 3.375 g, Intravenous, Q8HRS, Shayy Velarde M.D., Last Rate: 25 mL/hr at 11/17/23 0955, 3.375 g at 11/17/23 0955    HYDROmorphone (Dilaudid) injection 0.5 mg, 0.5 mg, Intravenous, Q3HRS PRN, Shayy Velarde M.D.    HYDROmorphone (Dilaudid) injection 1 mg, 1 mg, Intravenous, Q3HRS PRN, Shayy Velarde M.D., 1 mg at 11/17/23 0951    albuterol inhaler 2 Puff, 2 Puff, Inhalation, Q4H PRN (RT), Kamaljit Harvey M.D.    Current Outpatient Medications:  Current Facility-Administered Medications   Medication Dose Route Frequency Provider Last Rate Last Admin    heparin injection 2,600 Units  2,600 Units Intravenous PRN Shayy Velarde M.D.        And    heparin infusion 25,000 Units in 500 mL 0.45% NACL   Intravenous Continuous Shayy Velarde M.D. 21 mL/hr at 11/17/23 0900 1,050 Units/hr at 11/17/23 0900    mometasone-formoterol (Dulera) 100-5 MCG/ACT inhaler 2 Puff  2 Puff Inhalation BID (RT) Shayy Velarde M.D.   2 Puff at 11/17/23 1027    acetaminophen (Tylenol) tablet 650 mg  650 mg Oral Q6HRS PRN Shayy Velarde M.D.        ondansetron (Zofran) syringe/vial injection 4 mg  4 mg Intravenous Q4HRS PRN Shayy Velarde M.D.        ondansetron (Zofran ODT) dispertab 4 mg  4 mg Oral Q4HRS PRN Shayy Velarde M.D.        senna-docusate (Pericolace Or Senokot S) 8.6-50 MG per tablet 2 Tablet  2 Tablet Oral BID Shayy Velarde  M.D.        And    polyethylene glycol/lytes (Miralax) PACKET 1 Packet  1 Packet Oral QDAY PRN Shayy Velarde M.D.        And    magnesium hydroxide (Milk Of Magnesia) suspension 30 mL  30 mL Oral QDAY PRN Shayy Velarde M.D.        And    bisacodyl (Dulcolax) suppository 10 mg  10 mg Rectal QDAY PRN Shayy Velarde M.D.        piperacillin-tazobactam (Zosyn) 3.375 g in  mL IVPB  3.375 g Intravenous Q8HRS Shayy Velarde M.D. 25 mL/hr at 11/17/23 0955 3.375 g at 11/17/23 0955    HYDROmorphone (Dilaudid) injection 0.5 mg  0.5 mg Intravenous Q3HRS PRN Shayy Velarde M.D.        HYDROmorphone (Dilaudid) injection 1 mg  1 mg Intravenous Q3HRS PRN Shayy Velarde M.D.   1 mg at 11/17/23 0951    albuterol inhaler 2 Puff  2 Puff Inhalation Q4H PRN (RT) Kamaljit Harvey M.D.           Medication Allergy:  No Known Allergies    Family History:  History reviewed. No pertinent family history.    Social History:  Social History     Socioeconomic History    Marital status: Not on file     Spouse name: Not on file    Number of children: Not on file    Years of education: Not on file    Highest education level: Not on file   Occupational History    Not on file   Tobacco Use    Smoking status: Not on file    Smokeless tobacco: Not on file   Substance and Sexual Activity    Alcohol use: Not on file    Drug use: Not on file    Sexual activity: Not on file   Other Topics Concern    Not on file   Social History Narrative    Not on file     Social Determinants of Health     Financial Resource Strain: Not on file   Food Insecurity: Not on file   Transportation Needs: Not on file   Physical Activity: Not on file   Stress: Not on file   Social Connections: Not on file   Intimate Partner Violence: Not on file   Housing Stability: Not on file         MDM (Data Review):     Records reviewed and summarized in current documentation    Lab Data Review:  Recent Results (from the past 24  hour(s))   aPTT    Collection Time: 11/17/23 12:09 AM   Result Value Ref Range    APTT 31.3 24.7 - 36.0 sec   Prothrombin Time    Collection Time: 11/17/23 12:09 AM   Result Value Ref Range    PT 16.2 (H) 12.0 - 14.6 sec    INR 1.24 (H) 0.87 - 1.13   Heparin Xa (Unfractionated)    Collection Time: 11/17/23 12:09 AM   Result Value Ref Range    Heparin Xa (UFH) >1.10 (HH) IU/mL   TROPONIN    Collection Time: 11/17/23 12:09 AM   Result Value Ref Range    Troponin T 20 (H) 6 - 19 ng/L   LIPASE    Collection Time: 11/17/23 12:09 AM   Result Value Ref Range    Lipase 58 11 - 82 U/L   proBrain Natriuretic Peptide, NT    Collection Time: 11/17/23 12:09 AM   Result Value Ref Range    NT-proBNP 1023 (H) 0 - 125 pg/mL   CBC without Differential    Collection Time: 11/17/23  7:12 AM   Result Value Ref Range    WBC 9.6 4.8 - 10.8 K/uL    RBC 3.97 (L) 4.70 - 6.10 M/uL    Hemoglobin 12.7 (L) 14.0 - 18.0 g/dL    Hematocrit 38.5 (L) 42.0 - 52.0 %    MCV 97.0 81.4 - 97.8 fL    MCH 32.0 27.0 - 33.0 pg    MCHC 33.0 32.3 - 36.5 g/dL    RDW 52.4 (H) 35.9 - 50.0 fL    Platelet Count 210 164 - 446 K/uL    MPV 9.6 9.0 - 12.9 fL   Comp Metabolic Panel (CMP)    Collection Time: 11/17/23  7:34 AM   Result Value Ref Range    Sodium 139 135 - 145 mmol/L    Potassium 3.8 3.6 - 5.5 mmol/L    Chloride 106 96 - 112 mmol/L    Co2 24 20 - 33 mmol/L    Anion Gap 9.0 7.0 - 16.0    Glucose 91 65 - 99 mg/dL    Bun 15 8 - 22 mg/dL    Creatinine 0.79 0.50 - 1.40 mg/dL    Calcium 8.5 8.4 - 10.2 mg/dL    Correct Calcium 9.1 8.5 - 10.5 mg/dL    AST(SGOT) 71 (H) 12 - 45 U/L    ALT(SGPT) 60 (H) 2 - 50 U/L    Alkaline Phosphatase 115 (H) 30 - 99 U/L    Total Bilirubin 1.6 (H) 0.1 - 1.5 mg/dL    Albumin 3.3 3.2 - 4.9 g/dL    Total Protein 5.8 (L) 6.0 - 8.2 g/dL    Globulin 2.5 1.9 - 3.5 g/dL    A-G Ratio 1.3 g/dL   TROPONIN    Collection Time: 11/17/23  7:34 AM   Result Value Ref Range    Troponin T 21 (H) 6 - 19 ng/L   proBrain Natriuretic Peptide, NT     Collection Time: 11/17/23  7:34 AM   Result Value Ref Range    NT-proBNP 1028 (H) 0 - 125 pg/mL   APTT    Collection Time: 11/17/23  7:34 AM   Result Value Ref Range    APTT 87.6 (H) 24.7 - 36.0 sec   LIPASE    Collection Time: 11/17/23  7:34 AM   Result Value Ref Range    Lipase 29 11 - 82 U/L   ESTIMATED GFR    Collection Time: 11/17/23  7:34 AM   Result Value Ref Range    GFR (CKD-EPI) 83 >60 mL/min/1.73 m 2       Imaging/Procedures Review:      DX-PORTABLE FLUOROSCOPY < 1 HOUR    (Results Pending)        MDM (Assessment and Plan):     Patient Active Problem List    Diagnosis Date Noted    COPD (chronic obstructive pulmonary disease) (HCC) 11/17/2023    Gallstone pancreatitis 11/17/2023    Choledocholithiasis 11/17/2023    Acute cholecystitis 11/16/2023    Cholecystitis, acute 11/16/2023    Hypoxia 11/16/2023     This is a very pleasant Hopi 89 yo Swedish speaking male who was admitted to the hospital with a sudden onset of epigastric/RUQ abdominal pain, multiple episodes of non bloody emesis. Labs revealed elevated liver enzymes, acute pancreatitis. MRCP showed biliary stones with bile duct 10 mm. Recently diagnosed with PE started on Eliquis. Last dose was yesterday morning. Started on Heparin gtt IV and Zosyn IV.    ASSESSMENT:  Abdominal pain  Elevated liver enzymes  Elevated lipase now resolved  Hypoxia in ER with O2 sats 84% now on 2 L with O2 sats; 88-90%  Pulmonary embolus diagnosed Oct. 2023 started on Eliquis  Hopi, (speak in right ear)    PLAN;  ERCP today  Discontinue Heparin at 11:15  NPO  Likely will need surgery referral for possible cholecystectomy at optimal timing  Continue Zosyn IV      Thank your for the opportunity to assist in the care of your patient.  Please call for any questions or concerns.    LINDA Crane.

## 2023-11-17 NOTE — PROGRESS NOTES
Received patient from Night shift RN . Patient is awake and alert.On 2 liters via NC.Refused . Patients daughters at bedside. Patient denies any nausea. Fall precaution in place, kept bed in lowest position and call light within reach.Updated patient on strict NPO.     0915- Patient reports pain to abdomen, Prn Dilaudid IV given as per MAR    1016- Patient reports had red flushing and can not see for 2 minutes, MD Made aware.v/s checked by CNA and recorded. Advised not to give Dilaudid anymore    1020- Patient verbalized feeling much better now    1107- Patient was seen by MAREN SARKAR, ordered to held the heparin drip for ERCP this afternoon    1515-Patient off to floor for surgery  Verify to MD Salazar if Heparin drip to resume after Surgery, as per her, according to GI can resume 12 hours after procedure if the procedure goes well    1850-Patient back to floor

## 2023-11-17 NOTE — RESPIRATORY CARE
"   COPD EDUCATION by COPD CLINICAL EDUCATOR  11/17/2023 at 10:28 AM by Laura Clemente RRT     Patient reviewed by COPD education team. Patient does not have a history or diagnosis of COPD and is a former smoker.  Therefore, patient does not qualify for the COPD program.    COPD Screen  COPD Risk Screening  Do you have a history of COPD?: No  COPD Population Screener  During the past 4 weeks, how much did you feel short of breath?: None/Little of the time  Do you ever cough up any mucus or phlegm?: No/only with occasional colds or infections  In the past 12 months, you do less than you used to because of your breathing problems: Disagree/unsure  Have you smoked at least 100 cigarettes in your entire life?: No/don't know  How old are you?: 60+  COPD Screening Score: 2  COPD Coordinator Not Recommended: Yes    COPD Assessment  COPD Clinical Specialists ONLY  COPD Education Initiated: Yes--Short Intervention (Spoked with pt family pt quit smoking 50 yrs ago pk hx 10-15 yrs, from [place], had a PFT but no records in chart, family says they told them it was normal? Explained how we diagnosis COPD FEV1/FVC, needs referral to Pulmonary in [place])  Is this a COPD exacerbation patient?: No  DME Company: TBD  DME Equipment Type: oxygen  Physician Name: PCP in [place]  Referrals Initiated: Yes  Pulmonary Rehab: N/A  Smoking Cessation: N/A  Hospice: N/A  Home Health Care: N/A  Mobile Urgent Care Services: N/A  Geriatric Specialty Group: N/A  Private In-Home Care Agency: N/A    PFT Results    No results found for: \"PFT\"    Meds to Beds        MY COPD ACTION PLAN     It is recommended that patients and physicians /healthcare providers complete this action plan together. This plan should be discussed at each physician visit and updated as needed.    The green, yellow and red zones show groups of symptoms of COPD. This list of symptoms is not comprehensive, and you may experience other symptoms. In the \"Actions\" column, your " "healthcare provider has recommended actions for you to take based on your symptoms.    Patient Name: Lewis Farooq   YOB: 1930   Last Updated on:     Green Zone:  I am doing well today Actions     Usual activitiy and exercise level   Take daily medications     Usual amounts of cough and phlegm/mucus   Use oxygen as prescribed     Sleep well at night   Continue regular exercise/diet plan     Appetite is good   At all times avoid cigarette smoke, inhaled irritants     Daily Medications (these medications are taken every day):                Yellow Zone:  I am having a bad day or a COPD flare Actions     More breathless than usual   Continue daily medications     I have less energy for my daily activities   Use quick relief inhaler as ordered     Increased or thicker phlegm/mucus   Use oxygen as prescribed     Using quick relief inhaler/nebulizer more often   Get plenty of rest     Swelling of ankles more than usual   Use pursed lip breathing     More coughing than usual   At all times avoid cigarette smoke, inhaled irritants     I feel like I have a \"chest cold\"     Poor sleep and my symptoms woke me up     My appetite is not good     My medicine is not helping      Call provider immediately if symptoms don’t improve     Continue daily medications, add rescue medications:               Medications to be used during a flare up, (as Discussed with Provider):              Red Zone:  I need urgent medical care Actions     Severe shortness of breath even at rest   Call 911 or seek medical care immediately     Not able to do any activity because of breathing      Fever or shaking chills      Feeling confused or very drowsy       Chest pains      Coughing up blood                  "

## 2023-11-17 NOTE — CARE PLAN
The patient is Stable - Low risk of patient condition declining or worsening    Shift Goals  Clinical Goals: Patient will be free from fall throughout the shift  Patient Goals: rest comfortbaly  Family Goals: get well soon    Progress made toward(s) clinical / shift goals:  Patient is free from fall. Patient is up to the bathroom, hand held assist.  Remained on 2 liters via NC and on strict NPO. For ERCP this afternoon, updated family     Patient is not progressing towards the following goals:    Problem: Pain - Standard  Goal: Alleviation of pain or a reduction in pain to the patient’s comfort goal  Outcome: Progressing     Problem: Fall Risk  Goal: Patient will remain free from falls  Outcome: Progressing     Problem: Psychosocial  Goal: Patient's ability to verbalize feelings about condition will improve  Outcome: Progressing     Problem: Hemodynamics  Goal: Patient's hemodynamics, fluid balance and neurologic status will be stable or improve  Outcome: Progressing     Problem: Respiratory  Goal: Patient will achieve/maintain optimum respiratory ventilation and gas exchange  Outcome: Progressing     Problem: Gastrointestinal Irritability  Goal: Nausea and vomiting will be absent or improve  Outcome: Progressing     Problem: Mobility  Goal: Patient's capacity to carry out activities will improve  Outcome: Progressing     Problem: Self Care  Goal: Patient will have the ability to perform ADLs independently or with assistance (bathe, groom, dress, toilet and feed)  Outcome: Progressing

## 2023-11-17 NOTE — PROGRESS NOTES
4 Eyes Skin Assessment Completed by KASHMIR Em and KASHMIR Fuentes.    Head WDL  Ears WDL  Nose WDL  Mouth WDL  Neck WDL  Breast/Chest WDL  Shoulder Blades WDL  Spine WDL  (R) Arm/Elbow/Hand WDL  (L) Arm/Elbow/Hand WDL  Abdomen WDL  Groin WDL  Scrotum/Coccyx/Buttocks WDL  (R) Leg Bruising  (L) Leg Scab  (R) Heel/Foot/Toe dry  (L) Heel/Foot/Toe Scab dry          Devices In Places Pulse Ox      Interventions In Place Gray Ear Foams and Pillows    Possible Skin Injury No    Pictures Uploaded Into Epic N/A  Wound Consult Placed N/A  RN Wound Prevention Protocol Ordered No

## 2023-11-17 NOTE — PROGRESS NOTES
Med Rec completed per patient's family   Allergies reviewed  No ORAL antibiotics in last 30 days    Patient gets his Theophylline and Tramadol from [place]     Patient takes Eliquis 5 mg twice a day   Last dose 11/16/2023 AM

## 2023-11-17 NOTE — PROGRESS NOTES
Triage officer note  Patient being transferred from [facility]  Reason for transfer at common bile duct stone found on MRCP x3  Will need gastrointestinal consultation for ERCP.  Patient has lipase that is over 800, patient's liver function tests are also elevated.  Vitals are stable and patient has acute abdominal pain with nausea.  Has history of PE on anticoagulation  Has history of CHF  Full code

## 2023-11-17 NOTE — PROGRESS NOTES
- Received report from ED nurse Doug.  -2110 - Pt arrived via gurney, admitted to room 204 from ED. Pt is A&O x 4, on 2 lpm via NC.  Pt denies nausea at this time. Pt oriented to room and call light. Messaged DR Valencia that pt is here on the floor, acknowledged. Call light within reach, bed in lowest position,  fall precautions in place, all needs met at this time.     2330 - pt placed on cardiac monitor per order.  0003 - received a call from Alex solis; pt sustaining on 46; pt checked and was sleeping and when I woke the pt up; HR went to 54 and pt has no complaints; charge nurse aware.     - pt's daughter signed the refusal of  services since pt was Telugu speaking.     0139 - started heparin drip; verified w/ charge nurse at bedside; ordered APTT after 6 hrs later at 0739 today.   - messaged hospitalist on call via voalte on update for diet since pt will not undergo surgery in AM per Dr. Valencia, and replied to still maintain strict NPO as ordered due to tx for pancreatitis; pt and family at bedside made aware.   0640 - Dr Valencia called regarding lab results that should be drawn at 0000; called lab and verified w/ lab orders; talked w/ charge nurse and charge nurse called lab about the labs; Dr Valencia informed and aware.

## 2023-11-17 NOTE — PROGRESS NOTES
0038 - lab called for a critical value of Heparin at >1.10 ; pt was on Eliquis at home; charge nurse aware at bedside; Dr Valencia informed and changed order for the heparin drip.

## 2023-11-17 NOTE — ASSESSMENT & PLAN NOTE
Patient's family state patient's oxygen saturation is about 87% at home. He is not on home oxygen. Currently saturating 92% on 2 L/min nasal cannula. Home oxygen has been ordered. He takes Advair, Atrovent, and Theophylline at home. He has been advised to follow up with a Pulmonologist in [place] where he lives.

## 2023-11-17 NOTE — ASSESSMENT & PLAN NOTE
Lipase was 785 at outside facility, improved to 23.  MRCP showed distended gallbladder containing sludge and mild common bile duct dilation of 10 mm and stones to the mid to distal common bile duct measuring 3 to 4 mm

## 2023-11-18 VITALS
BODY MASS INDEX: 24.73 KG/M2 | HEART RATE: 60 BPM | RESPIRATION RATE: 16 BRPM | WEIGHT: 144.84 LBS | OXYGEN SATURATION: 94 % | TEMPERATURE: 97.4 F | SYSTOLIC BLOOD PRESSURE: 138 MMHG | DIASTOLIC BLOOD PRESSURE: 54 MMHG | HEIGHT: 64 IN

## 2023-11-18 PROBLEM — K80.50 CHOLEDOCHOLITHIASIS: Status: RESOLVED | Noted: 2023-11-17 | Resolved: 2023-11-18

## 2023-11-18 PROBLEM — K85.10 GALLSTONE PANCREATITIS: Status: RESOLVED | Noted: 2023-11-17 | Resolved: 2023-11-18

## 2023-11-18 LAB
ALBUMIN SERPL BCP-MCNC: 3 G/DL (ref 3.2–4.9)
ALBUMIN/GLOB SERPL: 1.2 G/DL
ALP SERPL-CCNC: 103 U/L (ref 30–99)
ALT SERPL-CCNC: 46 U/L (ref 2–50)
ANION GAP SERPL CALC-SCNC: 14 MMOL/L (ref 7–16)
AST SERPL-CCNC: 37 U/L (ref 12–45)
BILIRUB SERPL-MCNC: 1 MG/DL (ref 0.1–1.5)
BUN SERPL-MCNC: 13 MG/DL (ref 8–22)
CALCIUM ALBUM COR SERPL-MCNC: 9.4 MG/DL (ref 8.5–10.5)
CALCIUM SERPL-MCNC: 8.6 MG/DL (ref 8.4–10.2)
CHLORIDE SERPL-SCNC: 105 MMOL/L (ref 96–112)
CO2 SERPL-SCNC: 21 MMOL/L (ref 20–33)
CREAT SERPL-MCNC: 0.83 MG/DL (ref 0.5–1.4)
ERYTHROCYTE [DISTWIDTH] IN BLOOD BY AUTOMATED COUNT: 52.9 FL (ref 35.9–50)
GFR SERPLBLD CREATININE-BSD FMLA CKD-EPI: 82 ML/MIN/1.73 M 2
GLOBULIN SER CALC-MCNC: 2.5 G/DL (ref 1.9–3.5)
GLUCOSE SERPL-MCNC: 68 MG/DL (ref 65–99)
HCT VFR BLD AUTO: 39.5 % (ref 42–52)
HGB BLD-MCNC: 12.7 G/DL (ref 14–18)
LIPASE SERPL-CCNC: 24 U/L (ref 11–82)
MCH RBC QN AUTO: 31.6 PG (ref 27–33)
MCHC RBC AUTO-ENTMCNC: 32.2 G/DL (ref 32.3–36.5)
MCV RBC AUTO: 98.3 FL (ref 81.4–97.8)
PLATELET # BLD AUTO: 205 K/UL (ref 164–446)
PMV BLD AUTO: 10.3 FL (ref 9–12.9)
POTASSIUM SERPL-SCNC: 3.7 MMOL/L (ref 3.6–5.5)
PROT SERPL-MCNC: 5.5 G/DL (ref 6–8.2)
RBC # BLD AUTO: 4.02 M/UL (ref 4.7–6.1)
SODIUM SERPL-SCNC: 140 MMOL/L (ref 135–145)
WBC # BLD AUTO: 8.7 K/UL (ref 4.8–10.8)

## 2023-11-18 PROCEDURE — 94760 N-INVAS EAR/PLS OXIMETRY 1: CPT

## 2023-11-18 PROCEDURE — 94640 AIRWAY INHALATION TREATMENT: CPT

## 2023-11-18 PROCEDURE — 85027 COMPLETE CBC AUTOMATED: CPT

## 2023-11-18 PROCEDURE — 80053 COMPREHEN METABOLIC PANEL: CPT

## 2023-11-18 PROCEDURE — 700105 HCHG RX REV CODE 258: Performed by: HOSPITALIST

## 2023-11-18 PROCEDURE — 700102 HCHG RX REV CODE 250 W/ 637 OVERRIDE(OP): Performed by: INTERNAL MEDICINE

## 2023-11-18 PROCEDURE — 99239 HOSP IP/OBS DSCHRG MGMT >30: CPT | Performed by: INTERNAL MEDICINE

## 2023-11-18 PROCEDURE — 36415 COLL VENOUS BLD VENIPUNCTURE: CPT

## 2023-11-18 PROCEDURE — 83690 ASSAY OF LIPASE: CPT

## 2023-11-18 PROCEDURE — 700111 HCHG RX REV CODE 636 W/ 250 OVERRIDE (IP): Mod: JZ | Performed by: HOSPITALIST

## 2023-11-18 PROCEDURE — A9270 NON-COVERED ITEM OR SERVICE: HCPCS | Performed by: INTERNAL MEDICINE

## 2023-11-18 RX ORDER — AMOXICILLIN AND CLAVULANATE POTASSIUM 875; 125 MG/1; MG/1
1 TABLET, FILM COATED ORAL 2 TIMES DAILY
Qty: 6 TABLET | Refills: 0 | Status: ACTIVE | OUTPATIENT
Start: 2023-11-18 | End: 2023-11-21

## 2023-11-18 RX ADMIN — APIXABAN 5 MG: 5 TABLET, FILM COATED ORAL at 09:37

## 2023-11-18 RX ADMIN — MOMETASONE FUROATE AND FORMOTEROL FUMARATE DIHYDRATE 2 PUFF: 100; 5 AEROSOL RESPIRATORY (INHALATION) at 07:24

## 2023-11-18 RX ADMIN — PIPERACILLIN AND TAZOBACTAM 3.38 G: 3; .375 INJECTION, POWDER, FOR SOLUTION INTRAVENOUS at 03:38

## 2023-11-18 RX ADMIN — PIPERACILLIN AND TAZOBACTAM 3.38 G: 3; .375 INJECTION, POWDER, FOR SOLUTION INTRAVENOUS at 11:04

## 2023-11-18 ASSESSMENT — ENCOUNTER SYMPTOMS
WEAKNESS: 1
CHILLS: 1
GASTROINTESTINAL NEGATIVE: 1
EYES NEGATIVE: 1
CARDIOVASCULAR NEGATIVE: 1
NEUROLOGICAL NEGATIVE: 1
NAUSEA: 1
PSYCHIATRIC NEGATIVE: 1
MUSCULOSKELETAL NEGATIVE: 1
RESPIRATORY NEGATIVE: 1
CONSTITUTIONAL NEGATIVE: 1
ABDOMINAL PAIN: 1
VOMITING: 1

## 2023-11-18 ASSESSMENT — PAIN DESCRIPTION - PAIN TYPE: TYPE: SURGICAL PAIN

## 2023-11-18 NOTE — DISCHARGE SUMMARY
Discharge Summary    CHIEF COMPLAINT ON ADMISSION  No chief complaint on file.      Reason for Admission  Choledocolithiasis     Admission Date  11/16/2023    CODE STATUS  Full Code    HPI & HOSPITAL COURSE  Lewis Farooq is a 89 y.o. male transferred from [facility] on 11/16/2023 where he presented with acute right upper quadrant abdominal pain, nausea, and vomiting.      CT showing gallbladder sludge but no biliary duct dilation. MRCP showed distended gallbladder containing sludge and mild common bile duct dilation of 10 mm and stones to the mid to distal common bile duct measuring 3 to 4 mm.  He was started on Zosyn and transferred to [facility] for GI consult and possible ERCP.     He was diagnosed with pulmonary embolism 2 months ago and is on Eliquis. He has a history of COPD (not on home oxygen, home oxygen reading s are about 87% on room air) , CHF (unknown EF), and BPH.    Evaluated by GI.  Labs showed AST of 71, ALT of 60, total bilirubin 1.6.Lipase was normal.  Planned for ERCP on 11/17/2023.     Patient underwent ERCP with biliary sphincterectomy and bile duct stone extraction on 11/7/2023. AST has improved to 37, ALT has improved to 46, total bilirubin improved to 1.0.    Discussed with General Surgery, per recommendations, patient is not a good surgical candidate due to his advanced age and medical comorbidities.     Patient's family state patient's oxygen saturation is about 87% at home. He is not on home oxygen. Currently saturating 92% on 2 L/min nasal cannula. Home oxygen has been ordered. He takes Advair, Atrovent, and Theophylline at home. He has been advised to follow up with a Pulmonologist in [place] where he lives.     Augmentin has been prescribed for 3 more days. Afebrile and hemodynamically stable. Patient has been advised to follow up with his PCP in 2-3 days     Therefore, he is discharged in fair and stable condition to home with close outpatient  follow-up.      Discharge Date  11/18/2023    FOLLOW UP ITEMS POST DISCHARGE  PCP in 2-3 days  Pulmonologist in next few days    DISCHARGE DIAGNOSES  Principal Problem (Resolved):    Choledocholithiasis (POA: Unknown)  Active Problems:    Hypoxia (POA: Unknown)    COPD (chronic obstructive pulmonary disease) (HCC) (POA: Unknown)  Resolved Problems:    Gallstone pancreatitis (POA: Unknown)      FOLLOW UP  No future appointments.  No follow-up provider specified.    MEDICATIONS ON DISCHARGE     Medication List        ASK your doctor about these medications        Instructions   atorvastatin 20 MG Tabs  Commonly known as: Lipitor   Take 20 mg by mouth every evening.  Dose: 20 mg     Atrovent HFA 17 MCG/ACT Aers  Generic drug: ipratropium   Inhale 2 Puffs every 6 hours as needed (Shortness of breath).  Dose: 2 Puff     Eliquis 5mg Tabs  Generic drug: apixaban   Take 5 mg by mouth 2 times a day.  Dose: 5 mg     fluticasone-salmeterol 250-50 MCG/ACT Aepb  Commonly known as: Advair   Inhale 1 Puff 2 times a day.  Dose: 1 Puff     omeprazole 20 MG delayed-release capsule  Commonly known as: PriLOSEC   Take 20 mg by mouth 2 times a day.  Dose: 20 mg     theophylline 100 MG SR capsule  Commonly known as: Kerwin-24   Take 100 mg by mouth every day. Medication from [place] (Teofilina)  Dose: 100 mg     tramadol-acetaminophen 37.5-325 MG per tablet  Commonly known as: Ultracet   Take 1 Tablet by mouth 2 times a day as needed. Indications: Acute Pain  Dose: 1 Tablet              Allergies  No Known Allergies    DIET  Orders Placed This Encounter   Procedures    Diet NPO Restrict to: Strict     Standing Status:   Standing     Number of Occurrences:   1     Order Specific Question:   Diet NPO Restrict to:     Answer:   Strict [1]       ACTIVITY  As tolerated.      CONSULTATIONS  GI  General Surgery    PROCEDURES  ERCP    LABORATORY  Lab Results   Component Value Date    SODIUM 140 11/18/2023    POTASSIUM 3.7 11/18/2023    CHLORIDE 105  11/18/2023    CO2 21 11/18/2023    GLUCOSE 68 11/18/2023    BUN 13 11/18/2023    CREATININE 0.83 11/18/2023        Lab Results   Component Value Date    WBC 8.7 11/18/2023    HEMOGLOBIN 12.7 (L) 11/18/2023    HEMATOCRIT 39.5 (L) 11/18/2023    PLATELETCT 205 11/18/2023        Total time of the discharge process exceeds 38 minutes.

## 2023-11-18 NOTE — OR NURSING
1521: Pt brought to pre-op holding via WC, family accompanied, brought by transport. Pt tolerated the transfer well, able to ambulate from the  to the [facility] without assistance. Pain is tolerable, no nausea, on 2 LPM of supplemental O2, tolerating it well also.  1608: Patient allergies and NPO status verified, home medication reconciliation completed and belongings secured. Patient verbalizes understanding of pain scale, expected course of stay and plan of care. Surgical site verified with patient. PIV patencies verified and hooked to fluids per MAR.

## 2023-11-18 NOTE — PROGRESS NOTES
Telemetry Shift Summary    Rhythm Sinus Bradycardia  HR Range 47-48  Ectopy rare PVC/PAC  Measurements .20/.14/.44        Normal Values  Rhythm SR  HR Range    Measurements 0.12-0.20 / 0.06-0.10  / 0.30-0.52

## 2023-11-18 NOTE — ANESTHESIA PROCEDURE NOTES
Airway    Date/Time: 11/17/2023 5:02 PM    Performed by: Reji Issa M.D.  Authorized by: Reji Issa M.D.    Location:  OR  Urgency:  Elective  Indications for Airway Management:  Anesthesia      Spontaneous Ventilation: absent    Sedation Level:  Deep  Preoxygenated: Yes    Patient Position:  Sniffing  Final Airway Type:  Endotracheal airway  Final Endotracheal Airway:  ETT  Cuffed: Yes    Technique Used for Successful ETT Placement:  Direct laryngoscopy    Insertion Site:  Oral  Blade Type:  Az  Laryngoscope Blade/Videolaryngoscope Blade Size:  3  ETT Size (mm):  7.0  Measured from:  Teeth  ETT to Teeth (cm):  22  Placement Verified by: auscultation and capnometry    Cormack-Lehane Classification:  Grade IIa - partial view of glottis  Number of Attempts at Approach:  1

## 2023-11-18 NOTE — CARE PLAN
The patient is Stable - Low risk of patient condition declining or worsening    Shift Goals  Clinical Goals: Patient will be able to wean off from oxygen and maintains SPO2 above 90% while on RA  Patient Goals: go home  Family Goals: go home today    Progress made toward(s) clinical / shift goals:  Patient is for DC today with DME oxygen.    Patient is not progressing towards the following goals:NA

## 2023-11-18 NOTE — DISCHARGE PLANNING
Received choice form @: 1140  Agency/Facility name: [facility]  Sent referral per choice form @: 1141    [facility] [place]  [facility]

## 2023-11-18 NOTE — DISCHARGE INSTRUCTIONS
Biliary Colic, Adult    Biliary colic is severe pain caused by a problem with the gallbladder. The gallbladder is a small organ in the upper right part of the abdomen. The gallbladder stores a digestive fluid produced in the liver (bile) that helps the body break down fat. Bile and other digestive enzymes are carried from the liver to the small intestine through tube-like structures called bile ducts. The gallbladder and the bile ducts form the biliary tract.  Sometimes, hard deposits of digestive fluids (gallstones) form in the gallbladder and block the flow of bile from the gallbladder, causing biliary colic. This condition is also called a gallbladder attack. Gallstones can be as small as a grain of sand or as big as a golf ball. There could be just one gallstone in the gallbladder, or there could be many.  What are the causes?  This condition is usually caused by gallstones. Less often, a tumor could block the flow of bile from the gallbladder and trigger biliary colic.  What increases the risk?  The following factors may make you more likely to develop this condition:  Being female.  Having a family history of gallstones.  Being obese.  Losing weight suddenly or quickly.  Eating a diet that is high in calories, low in fiber, and rich in refined carbohydrates, such as white bread and white rice.  Having certain health conditions, such as:  An intestinal disease that affects nutrient absorption, such as Crohn's disease.  A metabolic condition, such as diabetes or metabolic syndrome. Metabolic syndrome occurs when someone has high blood pressure, high cholesterol, and diabetes.  A blood condition, such as hemolytic anemia or sickle cell disease.  What are the signs or symptoms?  The main symptom of this condition is severe pain in the upper right side of the abdomen. You may feel this pain below the chest but above the hip. This pain often occurs at night or after eating a meal that is high in fat. This pain may  get worse for up to an hour and last as long as 12 hours. In most cases, the pain fades (subsides) within 2 hours.  Other symptoms of this condition include:  Nausea and vomiting.  Pain under the right shoulder.  How is this diagnosed?  This condition is diagnosed based on your medical history, your symptoms, and a physical exam.  You may also have tests, including:  Blood tests to rule out infection or inflammation of the bile ducts, gallbladder, pancreas, or liver.  Imaging studies, such as:  An ultrasound.  A CT scan.  An MRI.  In some cases, you may need to have an imaging study done using a small amount of radioactive material (nuclear medicine) to confirm the diagnosis.  How is this treated?  This condition may be treated with medicines to:  Relieve your pain or nausea.  Dissolve the gallstones. It may take months or years before the gallstones are completely gone.  If you have gallstones, or if you have a tumor in the gallbladder that is causing biliary colic, you may need surgery to remove the gallbladder (cholecystectomy).  Follow these instructions at home:  Eating and drinking  Drink enough fluid to keep your urine pale yellow.  Follow instructions from your health care provider about eating or drinking restrictions. These may include avoiding:  Fatty, greasy, and fried foods.  Any foods that make the pain worse.  Overeating.  Having a large meal after not eating for a while.  General instructions  Take over-the-counter and prescription medicines only as told by your health care provider.  Keep all follow-up visits as told by your health care provider. This is important.  How is this prevented?  Steps to prevent this condition include:  Maintaining a healthy body weight.  Getting regular exercise.  Eating a healthy diet that is high in fiber and low in fat.  Limiting how much sugar and refined carbohydrates you eat.  Contact a health care provider if:  Your pain lasts more than 5 hours.  You vomit.  You  have a fever and chills.  Your pain gets worse.  Get help right away if:  Your skin or the whites of your eyes look yellow (jaundice).  Your have tea-colored urine and light-colored stools (feces).  You are dizzy or you faint.  Summary  Biliary colic is severe pain caused by a problem with the gallbladder. The gallbladder is a small organ in the upper right part of your abdomen.  Treatment for this condition may include medicine to relieve your pain or nausea, or medicine to slowly dissolve the gallstones.  If you have gallstones, or if you have a tumor in the gallbladder that is causing biliary colic, you may need surgery to remove the gallbladder (cholecystectomy).  This information is not intended to replace advice given to you by your health care provider. Make sure you discuss any questions you have with your health care provider.  Document Revised: 12/29/2020 Document Reviewed: 10/20/2020  [employer] Patient Education © 2023 [employer]

## 2023-11-18 NOTE — OR NURSING
Telemetry Shift Summary     Rhythm SR  HR Range 65-79  Ectopy rPVC/trigem;rPAC  Measurements 0.19/0.14/0.42           Normal Values  Rhythm SR  HR Range    Measurements 0.12-0.20 / 0.06-0.10  / 0.30-0.52

## 2023-11-18 NOTE — DISCHARGE PLANNING
Case Management Discharge Planning    Admission Date: 11/16/2023  GMLOS: 4.4  ALOS: 2    6-Clicks ADL Score: 23  6-Clicks Mobility Score: 22      Anticipated Discharge Dispo: Discharge Disposition: Discharged to home/self care (01)    DME Needed: Yes    DME Ordered: Yes    Action(s) Taken: Updated Provider/Nurse on Discharge Plan  Patient discussed during morning IDT rounds with team. Patient is  medically cleared for discharge at this time.  SWCM placed call to daughter- Gerda, introduced self, explained role and reason for call. Patient lives in a multi level home with his daughters and family. Patient family assist in caring for him . Patient uses a cane to assist with ambulation. SWCM discuss home oxygen order with daughter and verbal choice is obtained.  Referral to be send via DPA assist.  Daughter will be his mode of transportation via private vehicle upon discharge.    SWCM to continue to follow for safe disposition.    [address] [place] [address] has approved home oxygen and  will deliver oxygen set up at bedside.   Daughter and nursing team is updated.  Escalations Completed: None    Medically Clear: Yes    Next Steps: pending home oxygen arrangements    Barriers to Discharge: DME    Is the patient up for discharge tomorrow: No

## 2023-11-18 NOTE — PROGRESS NOTES
4 Eyes Skin Assessment Completed by KASHMIR Em and KASHMIR Fuentes.    Head WDL  Ears WDL  Nose WDL  Mouth WDL  Neck WDL  Breast/Chest WDL  Shoulder Blades WDL  Spine WDL  (R) Arm/Elbow/Hand WDL  (L) Arm/Elbow/Hand WDL  Abdomen WDL  Groin WDL  Scrotum/Coccyx/Buttocks WDL  (R) Leg Bruising  (L) Leg Scab  (R) Heel/Foot/Toe dry  (L) Heel/Foot/Toe Scab dry          Devices In Places Pulse Ox      Interventions In Place Gray Ear Foams, Waffle Overlay, and Pillows    Possible Skin Injury No    Pictures Uploaded Into Epic N/A  Wound Consult Placed N/A  RN Wound Prevention Protocol Ordered No

## 2023-11-18 NOTE — CARE PLAN
The patient is Stable - Low risk of patient condition declining or worsening    Shift Goals  Clinical Goals: Pt can ambulate and void post op; continue IV abx.  Patient Goals: Sleep and eat soon.  Family Goals: Go home eventually with patient.    Progress made toward(s) clinical / shift goals:      Pt ambulated many times to the bathroom and back to bed; voided; no nausea and vomiting the whole shift; continued on IV abx.     Patient is not progressing towards the following goals:

## 2023-11-18 NOTE — PROGRESS NOTES
Received patient from Night shift RN . Patient is awake and alert.On 3 liters via NC. Family at bedside. Remained on strict NPO. Held Heparin drip as per MD. Fall precaution in place, kept bed in lowest position,bed alarm on  and call light within reach.    0950- patient ambulated to the hallway with the use of single point cane.  Walking O2 test done and charted    1506-Discharge paper work reviewed with patient and relatives at bedside.Copy given.Questions encouraged and aswered. Awaiting their ride then DC to home with DME Oxygen    1601-Patient escorted to ED entrance via wheelchair.Patient discharge to home.

## 2023-11-18 NOTE — OR SURGEON
ERCP operative note    PreOp Diagnosis: Choledocholithiasis      PostOp Diagnosis: Same      Procedure(s):  ERCP (ENDOSCOPIC RETROGRADE CHOLANGIOPANCREATOGRAPHY) - Wound Class: Contaminated    Surgeon(s):  Carlos Ross M.D.    Anesthesiologist/Type of Anesthesia:  Anesthesiologist: Reji Issa M.D./General    Surgical Staff:  Circulator: Marika Chaudhry R.N.  Endoscopy Technician: Susu Tyler  Relief Circulator: Alison Holliday  X-Ray Technologist: Alivia Louis    Specimens removed if any:  * No specimens in log *    Dr. Ross  [facility]  [facility]  [phone]    ERCP with: Biliary sphincterotomy    Bile duct stone extraction    Radiologic interpretation of static and dynamic fluoroscopic images    Indication: Symptomatic choledocholithiasis    Sedation: GA    Findings:    Ampulla     Unremarkable    Pancreatic duct     Neither injected nor cannulated    CBD     Impacted distal stone     Normal course and caliber     Unremarkable intrahepatic biliary tree     Therapeutics    10 mm biliary sphincterotomy with both papillotome over covered wire    9-12 mm occlusion balloon extraction of bile duct stones until clear    Plan:  Follow liver enzymes    Consideration for cholecystectomy but may be too high risk given age and comorbidities      Procedure detail:    Prior to procedure, informed consent was obtained.  Risks, benefits, alternatives, including but not limited to risk of bleeding, infection, perforation, adverse reaction to sedating medicine, failure to identify pathology, pancreatitis and death were explained to the patient and his family who translated and who accepted all risks.    Patient was prepped in the prone position after intubation and sedation was provided by anesthesia.    Scope tip of the Olympus flexible side-viewing TJF duodena scope was passed to the level of the biliary ampulla in the short position.  Initially what was thought to be the  ampulla was probed with the cannula but the scope was repositioned and the ampulla was found slightly distal to this.  The Olympus clever cut bow papillotome with a 0.025 revolution wire was utilized for selective cannulation of the common bile duct.  Initially it was felt the tome was in the right position but there was mild resistance and a small injection of contrast was made demonstrating a stone impacted at the distal bile duct.  The tome was repositioned and the stone was pushed away and the wire was passed into the intrahepatic biliary tree.  This was followed with a tome and injection of contrast confirmed good placement.  2 small filling defects were seen in the distal duct.  The intrahepatic biliary tree was unremarkable and the common bile duct had a normal course and caliber.  The wire was left in place and a 10 mm biliary sphincterotomy was performed with the bow papillotome over the covered wire.  The wire was left in place and the tome removed and exchanged for a 9-12 mm occlusion balloon.  This was passed to the common hepatic duct inflated to 12 mm and withdrawn down the duct with delivery of the stones.  The 12 mm balloon pulled with ease through the ampulla.  3 additional sweeps were made from the common hepatic duct all of which were negative for further stones or debris.  The procedure was deemed complete.  Air and liquid were suctioned.  The scope was withdrawn.  Patient tolerated the procedure well and was sent to recovery without immediate complications.      11/17/2023 5:45 PM Carlos Ross M.D.

## 2023-11-18 NOTE — ANESTHESIA PREPROCEDURE EVALUATION
Case: [id number] Date/Time: 11/17/23 1622    Procedure: ERCP (ENDOSCOPIC RETROGRADE CHOLANGIOPANCREATOGRAPHY)    Location:  ENDOSCOPIC ULTRASOUND ROOM / SURGERY [facility]    Surgeons: Carlos Ross M.D.          COPD, on atrovent, no home O2, 2lpm in hospital.  Initially hypoxic, 84%. Denies dyspnea.    Elevated BNP, lungs clear, no edema.    No anesthesia history.    NKDA    NPO    Relevant Problems   PULMONARY   (positive) COPD (chronic obstructive pulmonary disease) (HCC)       Physical Exam    Airway   Mallampati: II  TM distance: >3 FB  Neck ROM: full       Cardiovascular - normal exam  Rhythm: regular  Rate: normal     Dental - normal exam           Pulmonary - normal exam     Abdominal    Neurological - normal exam                   Anesthesia Plan    ASA 3   ASA physical status 3 criteria: COPD - poorly controlled    Plan - general       Airway plan will be ETT          Induction: intravenous    Postoperative Plan: Postoperative administration of opioids is intended.    Pertinent diagnostic labs and testing reviewed    Informed Consent:    Anesthetic plan and risks discussed with patient.    Use of blood products discussed with: patient whom consented to blood products.

## 2023-11-18 NOTE — FACE TO FACE
"Face to Face Note  -  Durable Medical Equipment    Dominique Salazar M.D. - NPI: [id number]  I certify that this patient is under my care and that they had a durable medical equipment(DME)face to face encounter by myself that meets the physician DME face-to-face encounter requirements with this patient on:    Date of encounter:   Patient:                    MRN:                       YOB: 2023  Lewis Farooq  [id number]  12/31/1930     The encounter with the patient was in whole, or in part, for the following medical condition, which is the primary reason for durable medical equipment:  COPD    I certify that, based on my findings, the following durable medical equipment is medically necessary:    Oxygen   HOME O2 Saturation Measurements:(Values must be present for Home Oxygen orders)  Room air sat at rest: 89  Room air sat with amb: 89  With liters of O2: 2, O2 sat at rest with O2: 93  With Liters of O2: 2, O2 sat with amb with O2 : 95  Is the patient mobile?: Yes  If patient feels more short of breath, they can go up to 6 liters per minute and contact healthcare provider.    Supporting Symptoms: The patient requires supplemental oxygen, as the following interventions have been tried with limited or no improvement: \"Bronchodilators and/or steroid inhalers, \"Positive expiratory pressure therapies, \"Oral and/or IV steroids, and \"Ambulation with oximetry.    My Clinical findings support the need for the above equipment due to:  Abnormal Gait, Hypoxia  "

## 2023-11-18 NOTE — PROGRESS NOTES
Received report from Belinda MARLOW. Pt resting in bed, family at bedside. Pt calm and sleeping on 3 lpm via NC. Pt updated with POC which includes pain mgt and still NPO at this time. Call light within reach, bed alarm on, bed in lowest position, fall precautions in place, all needs met at this time.  - As per Belinda MARLOW AM nurse; heparin drip will be held up to 12 hrs after surgery per Gen surgeon and may resume after that if surgery went well.     - q2 hr rounding in progress.    1935 - messaged Dr Harvey on call that AM nurse reported that pt had 1 time dilaudid earlier and reported flushing and was not able to see for a couple of seconds, Dr Salazar was aware as per Belinda MARLOW and told RN to not give dilaudid. Dr Harvey was made aware and ordered another prn iv meds for pain.     2350 - talked to Dr. Valencia during rounds on restarting heparin drip 12 hrs after procedure  as per GI recommendations; Dr Valencia placed Nsg communication to page Dr Salazar at 0700 to discuss restart of heparin drip. About pt's current diet, Dr Valencia verbalized to continue strict NPO diet as of this time.

## 2023-11-18 NOTE — OR NURSING
1744-To PACU from OR via [facility], sleeping, respirations spontaneous and non-labored via mask.     1750-Pt rouses. Pt with productive cough. Pt with clear sticky saliva. Pt given kleenex.     1759- Daughter to the bedside to help with translations as the patient is very [place] and unable to hear the IPAD. Pt given warm blankets as he c/o being cold. Pt denies pain or nausea.     1810- Pt with high blood pressure. Contacted Dr. Issa for orders.     1815- Spoke with Dr. Issa at this time on the telephone received ordered for medication for high BP. Pt BP is decreased at this time. Will continue to monitor.     1830- Pt denies pain or nausea. Pt c/o being cold. Pt given more warm blankets. Pt BP is decreased to 160/71    1840- PT BP continues to be decreased is 148/62. Patient met criteria for transfer to room via [facility] by RN.  Patient has good pain control. Denies N/V, tolerating PO well. Surgical dressing CDI. Ice pack sent with pt. Report called to Belinda. Pt transferred on 6 L via mask with full O2 tank. Pt has telemetry box on.

## 2023-11-18 NOTE — PROGRESS NOTES
Gastroenterology Consult Note:    HAYDEN Crane  Date & Time note created:    11/18/2023   11:34 AM     Referring MD:  Dr. Howell    Patient ID:  Name:             Lewis Ivy     YOB: 1930  Age:                 89 y.o.  male   MRN:               [id number]                                                             Reason for Consult:      Epigastric abdominal pain  N/V    History of Present Illness:    This is a 89 yo Greenlandic speaking male, his daughter, Gerda is the , [facility], recent PE started on Eliquis 10/2023 who was seen in consultation for sudden onset of epigastric/RUQ abdominal pain, multiple episodes of vomiting starting yesterday. Complaints of chills but no fever. He was seen at [facility] for these symptoms. He was found to be hypoxic O2 sats 84% now on 2 L of oxygen. Outside labs: TB: 2.3. Lipase: 785. MRCP: distended gallbladder containing small burden dependent sludge and mild common bile duct dilation of 10 mm with stones to the mid to distal common bile duct measuring 3-4 mm. Last dose of Eliquis was yesterday morning. Transferred to [facility] for possible ERCP. He was started on Zosyn IV and Heparin gtt. Repeat Labs this morning: AST: 71. ALT: 60. ALP: 115. TB: 1.6. Lipase: 29. INR: 1.24. NT-proBNP: 1028. Troponin: 21.     INTERVAL HISTORY:  11/18/23: Stable. No acute events overnight. Denies abdominal pain. Tolerated po intake. LFT's trending down. Dr. Salazar discussed the case with surgery who did not recommend cholecystectomy.    ERCP 11/17/23: CBD: Impacted distal stone. Normal course and caliber. Unremarkable intrahepatic biliary tree. Therapeutics: 10 mm biliary sphincterotomy with both papillotome over covered wire.    9-12 mm occlusion balloon extraction of bile duct stones until clear    Review of Systems:      Review of Systems   Constitutional:  Positive for chills and malaise/fatigue.   HENT: Negative.     Eyes: Negative.     Respiratory: Negative.     Cardiovascular: Negative.    Gastrointestinal:  Positive for abdominal pain, nausea and vomiting.   Genitourinary: Negative.    Musculoskeletal: Negative.    Skin: Negative.    Neurological:  Positive for weakness.   Psychiatric/Behavioral: Negative.               Physical Exam:  Vitals/ General Appearance:   Weight/BMI: Body mass index is 24.86 kg/m².    Vitals:    11/18/23 0055 11/18/23 0356 11/18/23 0724 11/18/23 0859   BP: (!) 142/57 130/63  135/61   Pulse: 63 64 60 (!) 55   Resp: 18 18 17 16   Temp: 36.3 °C (97.4 °F) 36.7 °C (98 °F)  36.2 °C (97.2 °F)   TempSrc: Temporal Temporal  Temporal   SpO2: 93% 93% 93% 92%   Weight:       Height:         Oxygen Therapy:  Pulse Oximetry: 92 %, O2 (LPM): 2, O2 Delivery Device: Nasal Cannula    Physical Exam  Vitals reviewed.   Constitutional:       Appearance: He is obese. He is ill-appearing.   HENT:      Head: Normocephalic and atraumatic.      Mouth/Throat:      Mouth: Mucous membranes are dry.      Comments: Missing teeth  Eyes:      Extraocular Movements: Extraocular movements intact.      Pupils: Pupils are equal, round, and reactive to light.   Cardiovascular:      Rate and Rhythm: Regular rhythm. Bradycardia present.      Pulses: Normal pulses.      Heart sounds: Normal heart sounds.   Pulmonary:      Effort: Pulmonary effort is normal.      Breath sounds: Normal breath sounds.   Abdominal:      General: Bowel sounds are normal.      Palpations: Abdomen is soft.      Tenderness: There is no abdominal tenderness. There is no guarding.   Musculoskeletal:         General: Normal range of motion.      Cervical back: Normal range of motion and neck supple.   Skin:     General: Skin is warm and dry.      Capillary Refill: Capillary refill takes 2 to 3 seconds.   Neurological:      General: No focal deficit present.      Mental Status: He is alert and oriented to person, place, and time.   Psychiatric:         Mood and Affect: Mood normal.          Behavior: Behavior normal.         Thought Content: Thought content normal.         Judgment: Judgment normal.         Past Medical History:   History reviewed. No pertinent past medical history.    Past Surgical History:  History reviewed. No pertinent surgical history.    Hospital Medications:    Current Facility-Administered Medications:     apixaban (Eliquis) tablet 5 mg, 5 mg, Oral, BID, Dominique Salazar M.D., 5 mg at 11/18/23 0937    mometasone-formoterol (Dulera) 100-5 MCG/ACT inhaler 2 Puff, 2 Puff, Inhalation, BID (RT), Shayy Velarde M.D., 2 Puff at 11/18/23 0724    morphine 4 MG/ML injection 2 mg, 2 mg, Intravenous, Q3HRS PRN **OR** morphine 4 MG/ML injection 4 mg, 4 mg, Intravenous, Q3HRS PRN, Kamaljit Harvey M.D.    acetaminophen (Tylenol) tablet 650 mg, 650 mg, Oral, Q6HRS PRN, Shayy Velarde M.D.    ondansetron (Zofran) syringe/vial injection 4 mg, 4 mg, Intravenous, Q4HRS PRN, Shayy Velarde M.D.    ondansetron (Zofran ODT) dispertab 4 mg, 4 mg, Oral, Q4HRS PRN, Shayy Velarde M.D.    senna-docusate (Pericolace Or Senokot S) 8.6-50 MG per tablet 2 Tablet, 2 Tablet, Oral, BID **AND** polyethylene glycol/lytes (Miralax) PACKET 1 Packet, 1 Packet, Oral, QDAY PRN **AND** magnesium hydroxide (Milk Of Magnesia) suspension 30 mL, 30 mL, Oral, QDAY PRN **AND** bisacodyl (Dulcolax) suppository 10 mg, 10 mg, Rectal, QDAY PRN, Shayy Velarde M.D.    [COMPLETED] piperacillin-tazobactam (Zosyn) 3.375 g in  mL IVPB, 3.375 g, Intravenous, Once, Stopped at 11/17/23 0022 **AND** piperacillin-tazobactam (Zosyn) 3.375 g in  mL IVPB, 3.375 g, Intravenous, Q8HRS, Shayy Velarde M.D., Last Rate: 25 mL/hr at 11/18/23 1104, 3.375 g at 11/18/23 1104    albuterol inhaler 2 Puff, 2 Puff, Inhalation, Q4H PRN (RT), Kamaljit Harvey M.D.    Current Outpatient Medications:  Current Facility-Administered Medications   Medication Dose Route Frequency Provider Last Rate  Last Admin    apixaban (Eliquis) tablet 5 mg  5 mg Oral BID Dominique Salazar M.D.   5 mg at 11/18/23 0937    mometasone-formoterol (Dulera) 100-5 MCG/ACT inhaler 2 Puff  2 Puff Inhalation BID (RT) Shayy Velarde M.D.   2 Puff at 11/18/23 0724    morphine 4 MG/ML injection 2 mg  2 mg Intravenous Q3HRS PRN Kamaljit Harvey M.D.        Or    morphine 4 MG/ML injection 4 mg  4 mg Intravenous Q3HRS PRN Kamaljit Harvey M.D.        acetaminophen (Tylenol) tablet 650 mg  650 mg Oral Q6HRS PRN Shayy Velarde M.D.        ondansetron (Zofran) syringe/vial injection 4 mg  4 mg Intravenous Q4HRS PRN Shayy Velarde M.D.        ondansetron (Zofran ODT) dispertab 4 mg  4 mg Oral Q4HRS PRN Shayy Velarde M.D.        senna-docusate (Pericolace Or Senokot S) 8.6-50 MG per tablet 2 Tablet  2 Tablet Oral BID Shayy Velarde M.D.        And    polyethylene glycol/lytes (Miralax) PACKET 1 Packet  1 Packet Oral QDAY PRN Shayy Velarde M.D.        And    magnesium hydroxide (Milk Of Magnesia) suspension 30 mL  30 mL Oral QDAY PRN Shayy Velarde M.D.        And    bisacodyl (Dulcolax) suppository 10 mg  10 mg Rectal QDAY PRN Shayy Velarde M.D.        piperacillin-tazobactam (Zosyn) 3.375 g in  mL IVPB  3.375 g Intravenous Q8HRS Shayy Velarde M.D. 25 mL/hr at 11/18/23 1104 3.375 g at 11/18/23 1104    albuterol inhaler 2 Puff  2 Puff Inhalation Q4H PRN (RT) Kamaljit Harvey M.D.           Medication Allergy:  No Known Allergies    Family History:  History reviewed. No pertinent family history.    Social History:  Social History     Socioeconomic History    Marital status: Not on file     Spouse name: Not on file    Number of children: Not on file    Years of education: Not on file    Highest education level: Not on file   Occupational History    Not on file   Tobacco Use    Smoking status: Former     Current packs/day: 0.50     Average packs/day: 0.4 packs/day for  63.9 years (24.4 ttl pk-yrs)     Types: Cigarettes     Start date: 1960    Smokeless tobacco: Never   Vaping Use    Vaping Use: Never used   Substance and Sexual Activity    Alcohol use: Not Currently    Drug use: Never    Sexual activity: Not on file   Other Topics Concern    Not on file   Social History Narrative    Not on file     Social Determinants of Health     Financial Resource Strain: Not on file   Food Insecurity: Not on file   Transportation Needs: Not on file   Physical Activity: Not on file   Stress: Not on file   Social Connections: Not on file   Intimate Partner Violence: Not on file   Housing Stability: Not on file         MDM (Data Review):     Records reviewed and summarized in current documentation    Lab Data Review:  Recent Results (from the past 24 hour(s))   TROPONIN    Collection Time: 11/17/23 11:36 AM   Result Value Ref Range    Troponin T 20 (H) 6 - 19 ng/L   LIPASE    Collection Time: 11/17/23 11:36 AM   Result Value Ref Range    Lipase 23 11 - 82 U/L   APTT    Collection Time: 11/17/23  1:47 PM   Result Value Ref Range    APTT 37.7 (H) 24.7 - 36.0 sec   CBC WITHOUT DIFFERENTIAL    Collection Time: 11/18/23  1:58 AM   Result Value Ref Range    WBC 8.7 4.8 - 10.8 K/uL    RBC 4.02 (L) 4.70 - 6.10 M/uL    Hemoglobin 12.7 (L) 14.0 - 18.0 g/dL    Hematocrit 39.5 (L) 42.0 - 52.0 %    MCV 98.3 (H) 81.4 - 97.8 fL    MCH 31.6 27.0 - 33.0 pg    MCHC 32.2 (L) 32.3 - 36.5 g/dL    RDW 52.9 (H) 35.9 - 50.0 fL    Platelet Count 205 164 - 446 K/uL    MPV 10.3 9.0 - 12.9 fL   Comp Metabolic Panel    Collection Time: 11/18/23  1:58 AM   Result Value Ref Range    Sodium 140 135 - 145 mmol/L    Potassium 3.7 3.6 - 5.5 mmol/L    Chloride 105 96 - 112 mmol/L    Co2 21 20 - 33 mmol/L    Anion Gap 14.0 7.0 - 16.0    Glucose 68 65 - 99 mg/dL    Bun 13 8 - 22 mg/dL    Creatinine 0.83 0.50 - 1.40 mg/dL    Calcium 8.6 8.4 - 10.2 mg/dL    Correct Calcium 9.4 8.5 - 10.5 mg/dL    AST(SGOT) 37 12 - 45 U/L    ALT(SGPT)  46 2 - 50 U/L    Alkaline Phosphatase 103 (H) 30 - 99 U/L    Total Bilirubin 1.0 0.1 - 1.5 mg/dL    Albumin 3.0 (L) 3.2 - 4.9 g/dL    Total Protein 5.5 (L) 6.0 - 8.2 g/dL    Globulin 2.5 1.9 - 3.5 g/dL    A-G Ratio 1.2 g/dL   ESTIMATED GFR    Collection Time: 11/18/23  1:58 AM   Result Value Ref Range    GFR (CKD-EPI) 82 >60 mL/min/1.73 m 2   LIPASE    Collection Time: 11/18/23  1:58 AM   Result Value Ref Range    Lipase 24 11 - 82 U/L       Imaging/Procedures Review:      DX-PORTABLE FLUOROSCOPY < 1 HOUR    (Results Pending)        MDM (Assessment and Plan):     Patient Active Problem List    Diagnosis Date Noted    COPD (chronic obstructive pulmonary disease) (HCC) 11/17/2023    Acute cholecystitis 11/16/2023    Cholecystitis, acute 11/16/2023    Hypoxia 11/16/2023     This is a very pleasant [place] 89 yo Telugu speaking male who was admitted to the hospital with a sudden onset of epigastric/RUQ abdominal pain, multiple episodes of non bloody emesis. Labs revealed elevated liver enzymes, acute pancreatitis. MRCP showed biliary stones with bile duct 10 mm. Recently diagnosed with PE started on Eliquis. Last dose was yesterday morning. Started on Heparin gtt IV and Zosyn IV.    ASSESSMENT:  Abdominal pain  Elevated liver enzymes  Elevated lipase now resolved  Hypoxia in ER with O2 sats 84% now on 2 L with O2 sats; 88-90%  Pulmonary embolus diagnosed Oct. 2023 started on Eliquis  [place], (speak in right ear)    PLAN;  Trend CMP  ADAT  Restart Eliquis     GI WILL SIGN OFF. PLEASE CALL IF ANY QUESTION OR CONCERNS,.    Thank your for the opportunity to assist in the care of your patient.  Please call for any questions or concerns.    LINDA Crane.

## 2023-11-19 NOTE — ASSESSMENT & PLAN NOTE
CT showing gallbladder sludge but no biliary duct dilation. MRCP showed distended gallbladder containing sludge and mild common bile duct dilation of 10 mm and stones to the mid to distal common bile duct measuring 3 to 4 mm. He was started on Zosyn and transferred to [facility].    Labs showed AST of 71, ALT of 60, total bilirubin 1.6. Lipase was normal. Underwent ERCP with biliary sphincterectomy and bile duct stone extraction on 11/7/2023. AST has improved to 37, ALT has improved to 46, total bilirubin improved to 1.0.

## 2023-11-19 NOTE — PROGRESS NOTES
Hospital Medicine Daily Progress Note    Date of Service  11/18/2023    Chief Complaint  Lewis Farooq is a 89 y.o. male transferred from [facility] on 11/16/2023 where he presented with acute right upper quadrant abdominal pain, nausea, and vomiting.     CT showing gallbladder sludge but no biliary duct dilation. MRCP showed distended gallbladder containing sludge and mild common bile duct dilation of 10 mm and stones to the mid to distal common bile duct measuring 3 to 4 mm.  He was started on Zosyn and transferred to [facility] for GI consult and possible ERCP.    He was diagnosed with pulmonary embolism 2 months ago and is on Eliquis.  He has a history of COPD, CHF (unknown EF), and BPH.    Hospital Course  Evaluated by GI. Labs showed AST of 71, ALT of 60, total bilirubin 1.6. Lipase was normal.      Underwent ERCP with biliary sphincterectomy and bile duct stone extraction on 11/7/2023. AST has improved to 37, ALT has improved to 46, total bilirubin improved to 1.0.     Interval Problem Update  Discussed with General Surgery, per recommendations, patient is not a good surgical candidate due to his advanced age and medical comorbidities.      Patient's family state patient's oxygen saturation is about 87% at home. He is not on home oxygen. Currently saturating 92% on 2 L/min nasal cannula. Home oxygen has been ordered.     I have discussed this patient's plan of care and discharge plan at IDT rounds today with Case Management, Nursing, Nursing leadership, and other members of the IDT team.    Consultants/Specialty  GI    Code Status  Full Code    Disposition  The patient is medically cleared for discharge to home or a post-acute facility.  Anticipate discharge to: home with close outpatient follow-up    I have placed the appropriate orders for post-discharge needs.    Review of Systems  Review of Systems   Constitutional: Negative.    HENT: Negative.     Eyes: Negative.    Respiratory: Negative.      Cardiovascular: Negative.    Gastrointestinal: Negative.    Genitourinary: Negative.    Musculoskeletal: Negative.    Skin: Negative.    Neurological: Negative.    Endo/Heme/Allergies: Negative.    Psychiatric/Behavioral: Negative.          Physical Exam  Temp:  [36.2 °C (97.2 °F)-37.4 °C (99.3 °F)] 36.3 °C (97.4 °F)  Pulse:  [47-71] 60  Resp:  [16-18] 16  BP: (124-183)/(54-71) 138/54  SpO2:  [91 %-98 %] 94 %    Physical Exam  Constitutional:       General: He is not in acute distress.  HENT:      Head: Normocephalic.      Nose: Nose normal.   Eyes:      Pupils: Pupils are equal, round, and reactive to light.   Cardiovascular:      Rate and Rhythm: Normal rate.   Pulmonary:      Effort: Pulmonary effort is normal.   Abdominal:      Palpations: Abdomen is soft.   Musculoskeletal:      Cervical back: Normal range of motion.      Right lower leg: No edema.      Left lower leg: No edema.   Skin:     General: Skin is warm.   Neurological:      General: No focal deficit present.      Mental Status: He is alert.   Psychiatric:         Mood and Affect: Mood normal.         Fluids    Intake/Output Summary (Last 24 hours) at 11/18/2023 1613  Last data filed at 11/18/2023 1257  Gross per 24 hour   Intake 995 ml   Output --   Net 995 ml       Laboratory  Recent Labs     11/17/23  0712 11/18/23  0158   WBC 9.6 8.7   RBC 3.97* 4.02*   HEMOGLOBIN 12.7* 12.7*   HEMATOCRIT 38.5* 39.5*   MCV 97.0 98.3*   MCH 32.0 31.6   MCHC 33.0 32.2*   RDW 52.4* 52.9*   PLATELETCT 210 205   MPV 9.6 10.3     Recent Labs     11/17/23  0734 11/18/23  0158   SODIUM 139 140   POTASSIUM 3.8 3.7   CHLORIDE 106 105   CO2 24 21   GLUCOSE 91 68   BUN 15 13   CREATININE 0.79 0.83   CALCIUM 8.5 8.6     Recent Labs     11/17/23  0009 11/17/23  0734 11/17/23  1347   APTT 31.3 87.6* 37.7*   INR 1.24*  --   --                Imaging  DX-PORTABLE FLUOROSCOPY < 1 HOUR    (Results Pending)        Assessment/Plan  * Acute cholecystitis- (present on  admission)  Assessment & Plan  CT showing gallbladder sludge but no biliary duct dilation. MRCP showed distended gallbladder containing sludge and mild common bile duct dilation of 10 mm and stones to the mid to distal common bile duct measuring 3 to 4 mm. He was started on Zosyn and transferred to [facility].    Labs showed AST of 71, ALT of 60, total bilirubin 1.6. Lipase was normal. Underwent ERCP with biliary sphincterectomy and bile duct stone extraction on 11/7/2023. AST has improved to 37, ALT has improved to 46, total bilirubin improved to 1.0.    COPD (chronic obstructive pulmonary disease) (HCC)  Assessment & Plan  Patient's family state patient's oxygen saturation is about 87% at home. He is not on home oxygen. Currently saturating 92% on 2 L/min nasal cannula. Home oxygen has been ordered. He takes Advair, Atrovent, and Theophylline at home. He has been advised to follow up with a Pulmonologist in [place] where he lives.     Hypoxia  Assessment & Plan  Patient has a history of COPD.  Currently saturating 92% on 2 L/min nasal cannula.  Does not use oxygen at home.  He has a history of pulm embolism, and is on Eliquis.  Eliquis was switched to IV heparin on admission.  Patient will need outpatient pulmonology follow-up         VTE prophylaxis: SCDs

## 2023-11-20 NOTE — ANESTHESIA TIME REPORT
Anesthesia Start and Stop Event Times       Date Time Event    11/17/2023 1653 Ready for Procedure     1654 Anesthesia Start     1747 Anesthesia Stop          Responsible Staff  11/17/23      Name Role Begin End    Reji Issa M.D. Anesth 1653 174          Overtime Reason:  no overtime (within assigned shift)    Comments:

## 2023-11-20 NOTE — ANESTHESIA POSTPROCEDURE EVALUATION
Patient: Lewis Farooq    Procedure Summary       Date: 11/17/23 Room / Location:  ENDOSCOPIC ULTRASOUND ROOM / SURGERY [facility]    Anesthesia Start: 1654 Anesthesia Stop: 1747    Procedure: ERCP (ENDOSCOPIC RETROGRADE CHOLANGIOPANCREATOGRAPHY) (Mouth) Diagnosis: (choledolcholithiasis)    Surgeons: Carlos Ross M.D. Responsible Provider: Reji Issa M.D.    Anesthesia Type: general ASA Status: 3            Final Anesthesia Type: general  Last vitals  BP   Blood Pressure : 138/54    Temp   36.3 °C (97.4 °F)    Pulse   60   Resp   16    SpO2   94 %      Anesthesia Post Evaluation    Patient location during evaluation: PACU  Patient participation: complete - patient participated  Level of consciousness: awake and alert    Airway patency: patent  Anesthetic complications: no  Cardiovascular status: hemodynamically stable  Respiratory status: acceptable  Hydration status: euvolemic    PONV: none          No notable events documented.     Nurse Pain Score: 0 (NPRS)

## 2023-11-23 ENCOUNTER — APPOINTMENT (OUTPATIENT)
Dept: RADIOLOGY | Facility: MEDICAL CENTER | Age: 88
DRG: 378 | End: 2023-11-23
Attending: STUDENT IN AN ORGANIZED HEALTH CARE EDUCATION/TRAINING PROGRAM
Payer: COMMERCIAL

## 2023-11-23 ENCOUNTER — HOSPITAL ENCOUNTER (INPATIENT)
Facility: MEDICAL CENTER | Age: 88
LOS: 3 days | DRG: 378 | End: 2023-11-26
Attending: INTERNAL MEDICINE | Admitting: INTERNAL MEDICINE
Payer: COMMERCIAL

## 2023-11-23 DIAGNOSIS — K92.1 GASTROINTESTINAL HEMORRHAGE WITH MELENA: ICD-10-CM

## 2023-11-23 PROBLEM — E78.5 HYPERLIPIDEMIA: Status: ACTIVE | Noted: 2023-11-23

## 2023-11-23 PROBLEM — Z86.711 HISTORY OF PULMONARY EMBOLISM: Status: ACTIVE | Noted: 2023-11-23

## 2023-11-23 PROBLEM — D64.9 NORMOCYTIC ANEMIA: Status: ACTIVE | Noted: 2023-11-23

## 2023-11-23 PROBLEM — K92.2 GI BLEED: Status: ACTIVE | Noted: 2023-11-23

## 2023-11-23 LAB
ALBUMIN SERPL BCP-MCNC: 2.6 G/DL (ref 3.2–4.9)
ALBUMIN/GLOB SERPL: 1 G/DL
ALP SERPL-CCNC: 62 U/L (ref 30–99)
ALT SERPL-CCNC: 15 U/L (ref 2–50)
ANION GAP SERPL CALC-SCNC: 9 MMOL/L (ref 7–16)
AST SERPL-CCNC: 16 U/L (ref 12–45)
BASOPHILS # BLD AUTO: 0.5 % (ref 0–1.8)
BASOPHILS # BLD: 0.04 K/UL (ref 0–0.12)
BILIRUB SERPL-MCNC: 0.6 MG/DL (ref 0.1–1.5)
BUN SERPL-MCNC: 13 MG/DL (ref 8–22)
CALCIUM ALBUM COR SERPL-MCNC: 9.5 MG/DL (ref 8.5–10.5)
CALCIUM SERPL-MCNC: 8.4 MG/DL (ref 8.5–10.5)
CHLORIDE SERPL-SCNC: 110 MMOL/L (ref 96–112)
CO2 SERPL-SCNC: 20 MMOL/L (ref 20–33)
CREAT SERPL-MCNC: 0.69 MG/DL (ref 0.5–1.4)
EOSINOPHIL # BLD AUTO: 0.16 K/UL (ref 0–0.51)
EOSINOPHIL NFR BLD: 2 % (ref 0–6.9)
ERYTHROCYTE [DISTWIDTH] IN BLOOD BY AUTOMATED COUNT: 51 FL (ref 35.9–50)
FERRITIN SERPL-MCNC: 146 NG/ML (ref 22–322)
GFR SERPLBLD CREATININE-BSD FMLA CKD-EPI: 86 ML/MIN/1.73 M 2
GLOBULIN SER CALC-MCNC: 2.6 G/DL (ref 1.9–3.5)
GLUCOSE SERPL-MCNC: 107 MG/DL (ref 65–99)
HCT VFR BLD AUTO: 33 % (ref 42–52)
HGB BLD-MCNC: 11.2 G/DL (ref 14–18)
HGB BLD-MCNC: 11.6 G/DL (ref 14–18)
HGB BLD-MCNC: 11.8 G/DL (ref 14–18)
HGB RETIC QN AUTO: 35.7 PG/CELL (ref 29–35)
IMM GRANULOCYTES # BLD AUTO: 0.04 K/UL (ref 0–0.11)
IMM GRANULOCYTES NFR BLD AUTO: 0.5 % (ref 0–0.9)
IMM RETICS NFR: 15.1 % (ref 2.6–16.1)
IRON SATN MFR SERPL: 18 % (ref 15–55)
IRON SERPL-MCNC: 32 UG/DL (ref 50–180)
LYMPHOCYTES # BLD AUTO: 1.83 K/UL (ref 1–4.8)
LYMPHOCYTES NFR BLD: 23.3 % (ref 22–41)
MAGNESIUM SERPL-MCNC: 1.8 MG/DL (ref 1.5–2.5)
MCH RBC QN AUTO: 32.5 PG (ref 27–33)
MCHC RBC AUTO-ENTMCNC: 33.9 G/DL (ref 32.3–36.5)
MCV RBC AUTO: 95.7 FL (ref 81.4–97.8)
MONOCYTES # BLD AUTO: 0.63 K/UL (ref 0–0.85)
MONOCYTES NFR BLD AUTO: 8 % (ref 0–13.4)
NEUTROPHILS # BLD AUTO: 5.17 K/UL (ref 1.82–7.42)
NEUTROPHILS NFR BLD: 65.7 % (ref 44–72)
NRBC # BLD AUTO: 0 K/UL
NRBC BLD-RTO: 0 /100 WBC (ref 0–0.2)
PLATELET # BLD AUTO: 213 K/UL (ref 164–446)
PMV BLD AUTO: 9.8 FL (ref 9–12.9)
POTASSIUM SERPL-SCNC: 4.1 MMOL/L (ref 3.6–5.5)
PROT SERPL-MCNC: 5.2 G/DL (ref 6–8.2)
RBC # BLD AUTO: 3.45 M/UL (ref 4.7–6.1)
RETICS # AUTO: 0.06 M/UL (ref 0.04–0.12)
RETICS/RBC NFR: 1.8 % (ref 0.8–2.6)
SODIUM SERPL-SCNC: 139 MMOL/L (ref 135–145)
TIBC SERPL-MCNC: 182 UG/DL (ref 250–450)
UIBC SERPL-MCNC: 150 UG/DL (ref 110–370)
WBC # BLD AUTO: 7.9 K/UL (ref 4.8–10.8)

## 2023-11-23 PROCEDURE — A9270 NON-COVERED ITEM OR SERVICE: HCPCS | Performed by: STUDENT IN AN ORGANIZED HEALTH CARE EDUCATION/TRAINING PROGRAM

## 2023-11-23 PROCEDURE — 85018 HEMOGLOBIN: CPT

## 2023-11-23 PROCEDURE — 770020 HCHG ROOM/CARE - TELE (206)

## 2023-11-23 PROCEDURE — 700117 HCHG RX CONTRAST REV CODE 255: Performed by: STUDENT IN AN ORGANIZED HEALTH CARE EDUCATION/TRAINING PROGRAM

## 2023-11-23 PROCEDURE — 700111 HCHG RX REV CODE 636 W/ 250 OVERRIDE (IP): Performed by: STUDENT IN AN ORGANIZED HEALTH CARE EDUCATION/TRAINING PROGRAM

## 2023-11-23 PROCEDURE — 700102 HCHG RX REV CODE 250 W/ 637 OVERRIDE(OP): Performed by: STUDENT IN AN ORGANIZED HEALTH CARE EDUCATION/TRAINING PROGRAM

## 2023-11-23 PROCEDURE — 700102 HCHG RX REV CODE 250 W/ 637 OVERRIDE(OP): Performed by: INTERNAL MEDICINE

## 2023-11-23 PROCEDURE — 83550 IRON BINDING TEST: CPT

## 2023-11-23 PROCEDURE — 99222 1ST HOSP IP/OBS MODERATE 55: CPT | Mod: AI | Performed by: STUDENT IN AN ORGANIZED HEALTH CARE EDUCATION/TRAINING PROGRAM

## 2023-11-23 PROCEDURE — 83735 ASSAY OF MAGNESIUM: CPT

## 2023-11-23 PROCEDURE — 36415 COLL VENOUS BLD VENIPUNCTURE: CPT

## 2023-11-23 PROCEDURE — A9270 NON-COVERED ITEM OR SERVICE: HCPCS | Performed by: INTERNAL MEDICINE

## 2023-11-23 PROCEDURE — 80053 COMPREHEN METABOLIC PANEL: CPT

## 2023-11-23 PROCEDURE — C9113 INJ PANTOPRAZOLE SODIUM, VIA: HCPCS | Performed by: STUDENT IN AN ORGANIZED HEALTH CARE EDUCATION/TRAINING PROGRAM

## 2023-11-23 PROCEDURE — 85046 RETICYTE/HGB CONCENTRATE: CPT

## 2023-11-23 PROCEDURE — 74177 CT ABD & PELVIS W/CONTRAST: CPT

## 2023-11-23 PROCEDURE — 83540 ASSAY OF IRON: CPT

## 2023-11-23 PROCEDURE — 82728 ASSAY OF FERRITIN: CPT

## 2023-11-23 PROCEDURE — 85025 COMPLETE CBC W/AUTO DIFF WBC: CPT

## 2023-11-23 RX ORDER — OXYCODONE HYDROCHLORIDE 5 MG/1
2.5 TABLET ORAL
Status: DISCONTINUED | OUTPATIENT
Start: 2023-11-23 | End: 2023-11-26 | Stop reason: HOSPADM

## 2023-11-23 RX ORDER — BISACODYL 10 MG
10 SUPPOSITORY, RECTAL RECTAL
Status: DISCONTINUED | OUTPATIENT
Start: 2023-11-23 | End: 2023-11-26 | Stop reason: HOSPADM

## 2023-11-23 RX ORDER — OXYCODONE HYDROCHLORIDE 5 MG/1
5 TABLET ORAL
Status: DISCONTINUED | OUTPATIENT
Start: 2023-11-23 | End: 2023-11-26 | Stop reason: HOSPADM

## 2023-11-23 RX ORDER — ONDANSETRON 2 MG/ML
4 INJECTION INTRAMUSCULAR; INTRAVENOUS EVERY 4 HOURS PRN
Status: DISCONTINUED | OUTPATIENT
Start: 2023-11-23 | End: 2023-11-26 | Stop reason: HOSPADM

## 2023-11-23 RX ORDER — ATORVASTATIN CALCIUM 20 MG/1
20 TABLET, FILM COATED ORAL NIGHTLY
Status: DISCONTINUED | OUTPATIENT
Start: 2023-11-23 | End: 2023-11-26 | Stop reason: HOSPADM

## 2023-11-23 RX ORDER — ONDANSETRON 4 MG/1
4 TABLET, ORALLY DISINTEGRATING ORAL EVERY 4 HOURS PRN
Status: DISCONTINUED | OUTPATIENT
Start: 2023-11-23 | End: 2023-11-26 | Stop reason: HOSPADM

## 2023-11-23 RX ORDER — POLYETHYLENE GLYCOL 3350 17 G/17G
1 POWDER, FOR SOLUTION ORAL
Status: DISCONTINUED | OUTPATIENT
Start: 2023-11-23 | End: 2023-11-26 | Stop reason: HOSPADM

## 2023-11-23 RX ORDER — HYDRALAZINE HYDROCHLORIDE 20 MG/ML
10 INJECTION INTRAMUSCULAR; INTRAVENOUS EVERY 4 HOURS PRN
Status: DISCONTINUED | OUTPATIENT
Start: 2023-11-23 | End: 2023-11-26 | Stop reason: HOSPADM

## 2023-11-23 RX ORDER — POLYETHYLENE GLYCOL 3350 17 G/17G
17 POWDER, FOR SOLUTION ORAL DAILY
COMMUNITY

## 2023-11-23 RX ORDER — HYDROMORPHONE HYDROCHLORIDE 1 MG/ML
0.25 INJECTION, SOLUTION INTRAMUSCULAR; INTRAVENOUS; SUBCUTANEOUS
Status: DISCONTINUED | OUTPATIENT
Start: 2023-11-23 | End: 2023-11-26 | Stop reason: HOSPADM

## 2023-11-23 RX ORDER — FLUTICASONE PROPIONATE AND SALMETEROL 250; 50 UG/1; UG/1
1 POWDER RESPIRATORY (INHALATION) 2 TIMES DAILY
Status: DISCONTINUED | OUTPATIENT
Start: 2023-11-23 | End: 2023-11-23

## 2023-11-23 RX ORDER — PANTOPRAZOLE SODIUM 40 MG/10ML
40 INJECTION, POWDER, LYOPHILIZED, FOR SOLUTION INTRAVENOUS 2 TIMES DAILY
Status: DISCONTINUED | OUTPATIENT
Start: 2023-11-23 | End: 2023-11-26 | Stop reason: HOSPADM

## 2023-11-23 RX ORDER — ACETAMINOPHEN 325 MG/1
650 TABLET ORAL EVERY 6 HOURS PRN
Status: DISCONTINUED | OUTPATIENT
Start: 2023-11-23 | End: 2023-11-26 | Stop reason: HOSPADM

## 2023-11-23 RX ORDER — AMOXICILLIN 250 MG
2 CAPSULE ORAL 2 TIMES DAILY
Status: DISCONTINUED | OUTPATIENT
Start: 2023-11-23 | End: 2023-11-26 | Stop reason: HOSPADM

## 2023-11-23 RX ORDER — GUAIFENESIN/DEXTROMETHORPHAN 100-10MG/5
10 SYRUP ORAL EVERY 6 HOURS PRN
Status: DISCONTINUED | OUTPATIENT
Start: 2023-11-23 | End: 2023-11-26 | Stop reason: HOSPADM

## 2023-11-23 RX ADMIN — PANTOPRAZOLE SODIUM 40 MG: 40 INJECTION, POWDER, FOR SOLUTION INTRAVENOUS at 17:06

## 2023-11-23 RX ADMIN — ATORVASTATIN CALCIUM 20 MG: 20 TABLET, FILM COATED ORAL at 20:07

## 2023-11-23 RX ADMIN — MOMETASONE FUROATE AND FORMOTEROL FUMARATE DIHYDRATE 2 PUFF: 200; 5 AEROSOL RESPIRATORY (INHALATION) at 20:07

## 2023-11-23 RX ADMIN — DOCUSATE SODIUM 50 MG AND SENNOSIDES 8.6 MG 2 TABLET: 8.6; 5 TABLET, FILM COATED ORAL at 17:08

## 2023-11-23 RX ADMIN — IOHEXOL 100 ML: 350 INJECTION, SOLUTION INTRAVENOUS at 20:55

## 2023-11-23 ASSESSMENT — PATIENT HEALTH QUESTIONNAIRE - PHQ9
1. LITTLE INTEREST OR PLEASURE IN DOING THINGS: NOT AT ALL
SUM OF ALL RESPONSES TO PHQ9 QUESTIONS 1 AND 2: 0
2. FEELING DOWN, DEPRESSED, IRRITABLE, OR HOPELESS: NOT AT ALL

## 2023-11-23 ASSESSMENT — LIFESTYLE VARIABLES
CONSUMPTION TOTAL: NEGATIVE
EVER FELT BAD OR GUILTY ABOUT YOUR DRINKING: NO
ALCOHOL_USE: NO
ON A TYPICAL DAY WHEN YOU DRINK ALCOHOL HOW MANY DRINKS DO YOU HAVE: 0
TOTAL SCORE: 0
HAVE PEOPLE ANNOYED YOU BY CRITICIZING YOUR DRINKING: NO
DOES PATIENT WANT TO STOP DRINKING: NO
EVER HAD A DRINK FIRST THING IN THE MORNING TO STEADY YOUR NERVES TO GET RID OF A HANGOVER: NO
AVERAGE NUMBER OF DAYS PER WEEK YOU HAVE A DRINK CONTAINING ALCOHOL: 0
TOTAL SCORE: 0
HOW MANY TIMES IN THE PAST YEAR HAVE YOU HAD 5 OR MORE DRINKS IN A DAY: 0
TOTAL SCORE: 0
HAVE YOU EVER FELT YOU SHOULD CUT DOWN ON YOUR DRINKING: NO

## 2023-11-23 ASSESSMENT — PAIN DESCRIPTION - PAIN TYPE
TYPE: ACUTE PAIN

## 2023-11-23 ASSESSMENT — COGNITIVE AND FUNCTIONAL STATUS - GENERAL
DAILY ACTIVITIY SCORE: 22
WALKING IN HOSPITAL ROOM: A LITTLE
MOBILITY SCORE: 22
SUGGESTED CMS G CODE MODIFIER DAILY ACTIVITY: CJ
CLIMB 3 TO 5 STEPS WITH RAILING: A LITTLE
HELP NEEDED FOR BATHING: A LITTLE
SUGGESTED CMS G CODE MODIFIER MOBILITY: CJ
DRESSING REGULAR LOWER BODY CLOTHING: A LITTLE

## 2023-11-23 ASSESSMENT — ENCOUNTER SYMPTOMS
MUSCULOSKELETAL NEGATIVE: 1
NEUROLOGICAL NEGATIVE: 1
CONSTITUTIONAL NEGATIVE: 1
EYES NEGATIVE: 1
RESPIRATORY NEGATIVE: 1
GASTROINTESTINAL NEGATIVE: 1
PSYCHIATRIC NEGATIVE: 1
CARDIOVASCULAR NEGATIVE: 1

## 2023-11-23 ASSESSMENT — FIBROSIS 4 INDEX: FIB4 SCORE: 2.45

## 2023-11-23 NOTE — PROGRESS NOTES
Patient admitted from [facility] as direct. Patient oriented to room and POC, all questions answered. Patient placed on tele SR, 61. Family bedside, patient requesting to use family to interrupt. Patient denies pain at this time. patient states he had a BM today, informed PT to leave it for RN to evaluate next time. Bed locked in lowest position, alarm in place, patient educated to call with needs.

## 2023-11-23 NOTE — ASSESSMENT & PLAN NOTE
Presenting to outside facility with anal pain and noted for gross melena  Gi consulted and case discussed   Plan for EGD tomorrow  Cont to monitor hemoglobin and transfuse if below 7

## 2023-11-23 NOTE — PROGRESS NOTES
[facility] DIRECT ADMISSION REPORT  Transferring facility: [facility]  Transferring physician: Dr. Fink    Chief complaint: GI bleed  Pertinent history & patient course: 89-year-old male with past medical history of recently diagnosed pulm embolism and recently underwent ERCP presented to outside facility with concerns of anal fissure pain.  On rectal exam patient found to have gross melena.  Transferring physician and started him on Protonix.  His last dose of Eliquis was 12 hours ago.  Patient current hemoglobin is 11.9 and his last hemoglobin in our system was 12.7.  As per transferring physician patient is hemodynamically stable for transfer.  Pertinent imaging & lab results: Hemoglobin has been trending down.  Consultants called prior to transfer and pertinent input from consultants: None  Code Status: Full code per transferring provider, I personally verified with the transferring provider patient's code status and the transferring provider has confirmed this with the patient.  Reason for Transfer: GI bleed requiring consultation with GI.  Further work up or recommendations requested prior to transfer: None    Patient accepted for transfer: Yes  Accepting [facility] Facility: [facility] - Nursing to notify the Triage Coordinator in the RTOC via Voalte or Phone ext. [phone] when patient arrives to the unit. The Triage Coordinator will assign the admitting provider.    Consultants to be called upon arrival: Gastroenterology  Admission status: Inpatient.   Floor requested: Telemetry  If ICU transfer, name of intensivist case discussed with and pertinent input from critical care: None    The admitting provider is the point of contact for questions or concerns regarding patient's care.

## 2023-11-23 NOTE — H&P
Hospital Medicine History & Physical Note    Date of Service  11/23/2023    Primary Care Physician  No primary care provider on file.    Consultants  None    Code Status  Full Code    Chief Complaint  Melena    History of Presenting Illness  89-year-old male with past medical history of COPD (not on home oxygen, home oxygen reading s are about 87% on room air) , CHF (unknown EF), and BPH, pulmonary embolism on Eliquis, choledocholithiasis status post ERCP with stent stone removal on 11/17/2023 by Dr. Ross was transferred from [facility] emergency department on 11/23/2023 where patient presented with anal pain and melena.    At [facility], stable vitals.  Patient current hemoglobin is 11.9 and his last hemoglobin in our system was 12.7.  Patient noted for gross melena on rectal exam.  Patient was started on IV PPI and transferred.  Patient was admitted for gastrointestinal bleed requiring IV proton pump inhibitor, telemetry monitoring and further medical management.    At the telemetry floor, stable vitals and patient saturating well on 1 L nasal cannula.  CBC with normocytic anemia with hemoglobin trending down slowly at 11.2, no leukocytosis.  Chemistry without gross normalities.     On bedside interview, patient and family at bedside reports that he has been having lower abdominal pain with bowel movements and unable to have a solid bowel movement since his hospital discharge on 11/17.  Bowel movements have been pure watery and lower abdominal pain intense which prompted him to go to the emergency department at [facility].  There he had a digital rectal exam and shortly after had assaulted bowel movement which was black.  Patient reports immediate relief and states marked improvement with his lower abdominal pain.  Before presenting to [facility] for his he does report having bandlike upper abdominal pain after procedure.    I discussed the plan of care with patient and bedside RN.    Review of  Systems  Review of Systems   Constitutional: Negative.    HENT: Negative.     Eyes: Negative.    Respiratory: Negative.     Cardiovascular: Negative.    Gastrointestinal: Negative.    Genitourinary: Negative.    Musculoskeletal: Negative.    Skin: Negative.    Neurological: Negative.    Endo/Heme/Allergies: Negative.    Psychiatric/Behavioral: Negative.       Past Medical History  Pulmonary embolism, BPH, COPD    Surgical History   has a past surgical history that includes pr ercp,diagnostic (N/A, 11/17/2023).     Family History  Family history reviewed with patient. There is no family history that is pertinent to the chief complaint.     Social History   reports that he has quit smoking. His smoking use included cigarettes. He started smoking about 63 years ago. He has a 24.4 pack-year smoking history. He has never used smokeless tobacco. He reports that he does not currently use alcohol. He reports that he does not use drugs.    Allergies  No Known Allergies    Medications  Prior to Admission Medications   Prescriptions Last Dose Informant Patient Reported? Taking?   apixaban (ELIQUIS) 5mg Tab  Family Member Yes No   Sig: Take 5 mg by mouth 2 times a day.   atorvastatin (LIPITOR) 20 MG Tab  Family Member Yes No   Sig: Take 20 mg by mouth every evening.   fluticasone-salmeterol (ADVAIR) 250-50 MCG/ACT AEROSOL POWDER, BREATH ACTIVATED  Family Member Yes No   Sig: Inhale 1 Puff 2 times a day.   ipratropium (ATROVENT HFA) 17 MCG/ACT Aero Soln  Family Member Yes No   Sig: Inhale 2 Puffs every 6 hours as needed (Shortness of breath).   omeprazole (PRILOSEC) 20 MG delayed-release capsule  Family Member Yes No   Sig: Take 20 mg by mouth 2 times a day.   theophylline (REJI-24) 100 MG SR capsule  Family Member Yes No   Sig: Take 100 mg by mouth every day. Medication from [place] ([place])   tramadol-acetaminophen (ULTRACET) 37.5-325 MG per tablet  Family Member Yes No   Sig: Take 1 Tablet by mouth 2 times a day as needed.  Indications: Acute Pain      Facility-Administered Medications: None       Physical Exam  Temp:  [36.5 °C (97.7 °F)] 36.5 °C (97.7 °F)  Pulse:  [61] 61  Resp:  [18] 18  BP: (138)/(72) 138/72  SpO2:  [97 %] 97 %  Blood Pressure : 138/72   Temperature: 36.5 °C (97.7 °F)   Pulse: 61   Respiration: 18   Pulse Oximetry: 97 %       Physical Exam  Constitutional:       General: He is not in acute distress.     Appearance: Normal appearance.   HENT:      Head: Normocephalic and atraumatic.      Nose: Nose normal. No congestion.      Mouth/Throat:      Mouth: Mucous membranes are moist.   Eyes:      Extraocular Movements: Extraocular movements intact.      Pupils: Pupils are equal, round, and reactive to light.   Cardiovascular:      Rate and Rhythm: Normal rate and regular rhythm.      Pulses: Normal pulses.      Heart sounds: Normal heart sounds.   Pulmonary:      Effort: Pulmonary effort is normal.      Breath sounds: Normal breath sounds.   Abdominal:      General: Bowel sounds are normal.      Palpations: Abdomen is soft.      Tenderness: There is no abdominal tenderness.   Musculoskeletal:         General: No swelling. Normal range of motion.      Cervical back: Normal range of motion and neck supple.   Skin:     General: Skin is warm.      Coloration: Skin is not jaundiced.   Neurological:      General: No focal deficit present.      Mental Status: He is alert and oriented to person, place, and time. Mental status is at baseline.      Cranial Nerves: No cranial nerve deficit.   Psychiatric:         Mood and Affect: Mood normal.         Behavior: Behavior normal.         Thought Content: Thought content normal.         Judgment: Judgment normal.         Laboratory:  Recent Labs     11/23/23  1550   WBC 7.9   RBC 3.45*   HEMOGLOBIN 11.6*  11.2*   HEMATOCRIT 33.0*   MCV 95.7   MCH 32.5   MCHC 33.9   RDW 51.0*   PLATELETCT 213   MPV 9.8     Recent Labs     11/23/23  1550   SODIUM 139   POTASSIUM 4.1   CHLORIDE 110   CO2  "20   GLUCOSE 107*   BUN 13   CREATININE 0.69   CALCIUM 8.4*     Recent Labs     11/23/23  1550   ALTSGPT 15   ASTSGOT 16   ALKPHOSPHAT 62   TBILIRUBIN 0.6   GLUCOSE 107*         No results for input(s): \"NTPROBNP\" in the last 72 hours.      No results for input(s): \"TROPONINT\" in the last 72 hours.    Imaging:  CT-ABDOMEN-PELVIS WITH    (Results Pending)     Assessment/Plan:  Justification for Admission Status  I anticipate this patient will require at least two midnights for appropriate medical management, necessitating inpatient admission because of below      * GI bleed- (present on admission)  Assessment & Plan  Presenting to outside facility with anal pain and noted for gross melena  Recent ERCP status post stone removal  Cholecystectomy not indicated back pain secondary to patient's advanced age    Admit to telemetry  Clear liquid diet  Hold home Eliquis for now  IV PPI  Frequent H&H  Labs in a.m.    History of pulmonary embolism- (present on admission)  Assessment & Plan  Hold Eliquis in the setting of possible GI bleed  Monitor hemoglobin    Normocytic anemia- (present on admission)  Assessment & Plan  Secondary to GI bleed  Continue IV PPI  Pending iron studies  Frequent H&H  Transfuse less than 7    Hyperlipidemia- (present on admission)  Assessment & Plan  Continue home statin    COPD (chronic obstructive pulmonary disease) (HCC)- (present on admission)  Assessment & Plan  Continue home inhalers  Hold tight feeling for now  RT/O2  Monitor oxygen      VTE prophylaxis: SCDs/TEDs    Greater than 51 minutes spent prepping to see patient (e.g. review of tests) obtaining and/or reviewing separately obtained history. Performing a medically appropriate examination and/ evaluation.  Counseling and educating the patient/family/caregiver.  Ordering medications, tests, or procedures.  Referring and communicating with other health care professionals.  Documenting clinical information in EPIC.  Independently " interpreting results and communicating results to patient/family/caregiver.  Care coordination

## 2023-11-24 LAB
ALBUMIN SERPL BCP-MCNC: 3 G/DL (ref 3.2–4.9)
BASOPHILS # BLD AUTO: 0.7 % (ref 0–1.8)
BASOPHILS # BLD: 0.04 K/UL (ref 0–0.12)
BUN SERPL-MCNC: 10 MG/DL (ref 8–22)
CALCIUM ALBUM COR SERPL-MCNC: 9.5 MG/DL (ref 8.5–10.5)
CALCIUM SERPL-MCNC: 8.7 MG/DL (ref 8.5–10.5)
CHLORIDE SERPL-SCNC: 108 MMOL/L (ref 96–112)
CO2 SERPL-SCNC: 20 MMOL/L (ref 20–33)
CREAT SERPL-MCNC: 0.76 MG/DL (ref 0.5–1.4)
EOSINOPHIL # BLD AUTO: 0.17 K/UL (ref 0–0.51)
EOSINOPHIL NFR BLD: 3.1 % (ref 0–6.9)
ERYTHROCYTE [DISTWIDTH] IN BLOOD BY AUTOMATED COUNT: 51.6 FL (ref 35.9–50)
GFR SERPLBLD CREATININE-BSD FMLA CKD-EPI: 84 ML/MIN/1.73 M 2
GLUCOSE SERPL-MCNC: 97 MG/DL (ref 65–99)
HCT VFR BLD AUTO: 34 % (ref 42–52)
HGB BLD-MCNC: 11.6 G/DL (ref 14–18)
HGB BLD-MCNC: 11.9 G/DL (ref 14–18)
IMM GRANULOCYTES # BLD AUTO: 0.03 K/UL (ref 0–0.11)
IMM GRANULOCYTES NFR BLD AUTO: 0.6 % (ref 0–0.9)
LYMPHOCYTES # BLD AUTO: 1.52 K/UL (ref 1–4.8)
LYMPHOCYTES NFR BLD: 28.1 % (ref 22–41)
MCH RBC QN AUTO: 32.4 PG (ref 27–33)
MCHC RBC AUTO-ENTMCNC: 34.1 G/DL (ref 32.3–36.5)
MCV RBC AUTO: 95 FL (ref 81.4–97.8)
MONOCYTES # BLD AUTO: 0.49 K/UL (ref 0–0.85)
MONOCYTES NFR BLD AUTO: 9.1 % (ref 0–13.4)
NEUTROPHILS # BLD AUTO: 3.16 K/UL (ref 1.82–7.42)
NEUTROPHILS NFR BLD: 58.4 % (ref 44–72)
NRBC # BLD AUTO: 0 K/UL
NRBC BLD-RTO: 0 /100 WBC (ref 0–0.2)
PHOSPHATE SERPL-MCNC: 3.9 MG/DL (ref 2.5–4.5)
PLATELET # BLD AUTO: 233 K/UL (ref 164–446)
PMV BLD AUTO: 9.7 FL (ref 9–12.9)
POTASSIUM SERPL-SCNC: 3.9 MMOL/L (ref 3.6–5.5)
RBC # BLD AUTO: 3.58 M/UL (ref 4.7–6.1)
SODIUM SERPL-SCNC: 139 MMOL/L (ref 135–145)
WBC # BLD AUTO: 5.4 K/UL (ref 4.8–10.8)

## 2023-11-24 PROCEDURE — 85025 COMPLETE CBC W/AUTO DIFF WBC: CPT

## 2023-11-24 PROCEDURE — C9113 INJ PANTOPRAZOLE SODIUM, VIA: HCPCS | Performed by: STUDENT IN AN ORGANIZED HEALTH CARE EDUCATION/TRAINING PROGRAM

## 2023-11-24 PROCEDURE — 770020 HCHG ROOM/CARE - TELE (206)

## 2023-11-24 PROCEDURE — 700111 HCHG RX REV CODE 636 W/ 250 OVERRIDE (IP): Performed by: STUDENT IN AN ORGANIZED HEALTH CARE EDUCATION/TRAINING PROGRAM

## 2023-11-24 PROCEDURE — 700102 HCHG RX REV CODE 250 W/ 637 OVERRIDE(OP): Performed by: STUDENT IN AN ORGANIZED HEALTH CARE EDUCATION/TRAINING PROGRAM

## 2023-11-24 PROCEDURE — 85018 HEMOGLOBIN: CPT

## 2023-11-24 PROCEDURE — 80069 RENAL FUNCTION PANEL: CPT

## 2023-11-24 PROCEDURE — 36415 COLL VENOUS BLD VENIPUNCTURE: CPT

## 2023-11-24 PROCEDURE — 99233 SBSQ HOSP IP/OBS HIGH 50: CPT | Performed by: INTERNAL MEDICINE

## 2023-11-24 PROCEDURE — 99254 IP/OBS CNSLTJ NEW/EST MOD 60: CPT | Performed by: INTERNAL MEDICINE

## 2023-11-24 PROCEDURE — A9270 NON-COVERED ITEM OR SERVICE: HCPCS | Performed by: STUDENT IN AN ORGANIZED HEALTH CARE EDUCATION/TRAINING PROGRAM

## 2023-11-24 RX ADMIN — PANTOPRAZOLE SODIUM 40 MG: 40 INJECTION, POWDER, FOR SOLUTION INTRAVENOUS at 18:20

## 2023-11-24 RX ADMIN — PANTOPRAZOLE SODIUM 40 MG: 40 INJECTION, POWDER, FOR SOLUTION INTRAVENOUS at 04:40

## 2023-11-24 RX ADMIN — MOMETASONE FUROATE AND FORMOTEROL FUMARATE DIHYDRATE 2 PUFF: 200; 5 AEROSOL RESPIRATORY (INHALATION) at 09:45

## 2023-11-24 RX ADMIN — ATORVASTATIN CALCIUM 20 MG: 20 TABLET, FILM COATED ORAL at 21:01

## 2023-11-24 RX ADMIN — MOMETASONE FUROATE AND FORMOTEROL FUMARATE DIHYDRATE 2 PUFF: 200; 5 AEROSOL RESPIRATORY (INHALATION) at 21:01

## 2023-11-24 ASSESSMENT — PAIN DESCRIPTION - PAIN TYPE
TYPE: ACUTE PAIN
TYPE: ACUTE PAIN

## 2023-11-24 ASSESSMENT — ENCOUNTER SYMPTOMS
PSYCHIATRIC NEGATIVE: 1
RESPIRATORY NEGATIVE: 1
EYES NEGATIVE: 1
MUSCULOSKELETAL NEGATIVE: 1
NEUROLOGICAL NEGATIVE: 1
CONSTITUTIONAL NEGATIVE: 1
CARDIOVASCULAR NEGATIVE: 1
GASTROINTESTINAL NEGATIVE: 1

## 2023-11-24 ASSESSMENT — FIBROSIS 4 INDEX: FIB4 SCORE: 1.78

## 2023-11-24 NOTE — CONSULTS
Date of Consultation:  11/24/2023    Patient: : Lewis Farooq  MRN: [id number]    Referring Physician:    Dr. Elvira Fitch     GI:Amy Johnson M.D.     Reason for Consultation:   Melena, GI bleeding.    History of Present Illness:   89 years old gentleman with recent Annabel Gregg cholelithiasis s/p ERCP with sphincter cut 1 week ago presented to inpatient GI service for melena in the last 2 days while he was taking Eliquis.  The patient had a rectal exam at outside hospital, which showed tarry stool, the patient continued to report and no discomfort after rectal exam.  GI team is consulted for possible endoscopy evaluation.    Based on the best nausea we can obtain, the last dose of Eliquis probably was on November 23 early morning or November 22 late afternoon.    Patient denied nausea vomiting, total 3 bowel movement in the last 24 hours.    Denies using NSAID.  Denied previous colonoscopy.  No regular upper GI endoscopy before, except for the recent ERCP.    Bedside exam shows soft abdomen.  No active GI symptoms aside.  The patient is taking clear liquid.    Patient's family is at bedside providing interpretation help.    No past medical history on file.      Past Surgical History:   Procedure Laterality Date    [place] ERCP,DIAGNOSTIC N/A 11/17/2023    Procedure: ERCP (ENDOSCOPIC RETROGRADE CHOLANGIOPANCREATOGRAPHY);  Surgeon: Carlos Ross M.D.;  Location: SURGERY [facility];  Service: Gastroenterology       No family history on file.    Social History     Socioeconomic History    Marital status:    Tobacco Use    Smoking status: Former     Current packs/day: 0.50     Average packs/day: 0.4 packs/day for 63.9 years (24.4 ttl pk-yrs)     Types: Cigarettes     Start date: 1960    Smokeless tobacco: Never   Vaping Use    Vaping Use: Never used   Substance and Sexual Activity    Alcohol use: Not Currently    Drug use: Never       Relatively limited review of system due to language  "barrier.  Constitutional: No fevers.  Eyes: No symptoms reported.   Ears/Nose/Throat/Mouth: No choking reported.  Cardiovascular: No chest pain reported.   Respiratory: Denies shortness of breath.  Gastrointestinal/Hepatic: Per H/P.   Skin/Breast: No new rash reported.   Psychiatric: No complaints      HEENT: grossly normal.  Cardiovascular: Please refer to primary team's daily assessment.   Lungs: Please refer to primary team's daily assessment.   Abdomen: Soft, No tenderness.  Skin: No erythema, No rash.  Lower limbs: normal, no pitting edema.   Neurologic: Alert & oriented x 3, Normal motor function, No focal deficits noted.  PSY: stable mood.     Physical Exam:  Vitals:    11/23/23 2354 11/24/23 0455 11/24/23 0900 11/24/23 0921   BP: 94/48 132/58 (!) (P) 145/62    Pulse: (!) 51 (!) 56 (!) (P) 53    Resp: 18 18 (P) 18    Temp: 37 °C (98.6 °F) 36.9 °C (98.4 °F) (P) 36.6 °C (97.9 °F)    TempSrc: Temporal Temporal (P) Temporal    SpO2: 96% 93% (P) 96% (P) 95%   Weight:  68.2 kg (150 lb 4 oz)     Height:  1.626 m (5' 4\")               Labs:  Recent Labs     11/23/23  1550 11/23/23  2327 11/24/23  0808   WBC 7.9  --  5.4   RBC 3.45*  --  3.58*   HEMOGLOBIN 11.6*  11.2* 11.8* 11.6*   HEMATOCRIT 33.0*  --  34.0*   MCV 95.7  --  95.0   MCH 32.5  --  32.4   MCHC 33.9  --  34.1   RDW 51.0*  --  51.6*   PLATELETCT 213  --  233   MPV 9.8  --  9.7     Recent Labs     11/23/23  1550 11/24/23  0808   SODIUM 139 139   POTASSIUM 4.1 3.9   CHLORIDE 110 108   CO2 20 20   GLUCOSE 107* 97   BUN 13 10           Recent Labs     11/23/23  1550   ASTSGOT 16   ALTSGPT 15   TBILIRUBIN 0.6   ALKPHOSPHAT 62   GLOBULIN 2.6         Imaging:  CT-ABDOMEN-PELVIS WITH  Narrative: 11/23/2023 8:53 PM    HISTORY/REASON FOR EXAM:  Abdominal pain. History of recent ERCP with sphincterotomy 11/17/2023.    TECHNIQUE/EXAM DESCRIPTION:   CT scan of the abdomen and pelvis with contrast.    Contrast-enhanced helical scanning was obtained from the " diaphragmatic domes through the pubic symphysis following the bolus administration of nonionic contrast without complication.    100 mL of Omnipaque 350 nonionic contrast was administered without complication.    Low dose optimization technique was utilized for this CT exam including automated exposure control and adjustment of the mA and/or kV according to patient size.    COMPARISON: No prior studies available.    FINDINGS:    Lower Chest: There are patchy groundglass opacities in the right anterior middle lobe. There is dependent atelectasis. Heart is mildly enlarged.    Liver: Unremarkable with a few subcentimeter hypodensities probably cysts or hemangiomas of no clinical significance.    Spleen: There is minimal peripheral capsular calcification possibly due to old injury or old inflammation. Spleen is otherwise unremarkable.    Pancreas: Unremarkable.    Gallbladder: There are no calcified stones. There is a small amount of air in the gallbladder.    Biliary: There is pneumobilia in the extrahepatic and intrahepatic bile ducts. No calcified stones in the duct.    Adrenal glands: Normal.    Kidneys: Kidneys demonstrate no hydronephrosis. There is a simple right renal cortical cyst. There is a hypodense lesion in the left renal cortex laterally and medially which are probably also cysts but too small to actually characterize. These are of   doubtful clinical significance.    Lymph nodes: No adenopathy.    Vasculature: There is atherosclerosis.    Bowel: No bowel obstruction. There is colonic diverticulosis. There is diffuse bowel wall thickening of the rectum.    Peritoneum: No ascites.    Pelvis: There are posterior bilateral bladder wall diverticula with probable bladder wall trabeculation.    Musculoskeletal: No acute or destructive process.  Impression: 1.  No acute inflammatory process in the abdomen or pelvis.  2.  No calcified gallstones or choledocholithiasis.  3.  There is pneumobilia with air in the  gallbladder as well as intrahepatic and extra hepatic biliary tree consistent with recent ERCP and sphincterotomy.  4.  No evidence of pancreatitis.  5.  There is colonic diverticulosis without diverticulitis.  6.  There is diffuse rectal wall thickening which could be inflammatory or neoplastic.  7.  Bladder trabeculation and diverticula but no CT evidence of cystitis.  8.  Minimal hazy right middle lobe groundglass opacities could be areas of atelectasis or developing pneumonitis.            Impressions:  89 years old gentleman with recent Annabel Gregg cholelithiasis s/p ERCP with sphincter cut 1 week ago presented to inpatient GI service for melena in the last 2 days while he was taking Eliquis.  The patient had a rectal exam at outside hospital, which showed tarry stool, the patient continued to report and no discomfort after rectal exam.  GI team is consulted for possible endoscopy evaluation.      The melena could be from regular upper GI pathology including erosion, ulcer, vascular lesion or even cancer, post ERCP bleeding at the sphincter area could be possible as well.    Agree to hold blood thinner if relatively safe, regular lab follow-up, IV PPI twice daily, transfusion per protocol if needed, midnight n.p.o. after clear liquid today.    GI team will offer upper GI scope on November 25 and also possible add on flexible sigmoid to check the possible trauma from previous rectal exam.    Diagnosis: upper gastrointestinal bleeding.   Procedure: Esophagogastroduodenoscopy with bleeding control including banding. Anal exam under sedation and possible add on flexible sigmoidoscopy.           This note was generated using voice recognition software which has a small chance of producing errors of grammar and possibly content. I have made every reasonable attempt to find and correct any obvious errors, but expect that some may not be found prior to finalization of this note.

## 2023-11-24 NOTE — DISCHARGE PLANNING
Care Transition Team Assessment    CM reviewed chart and participated in IDT rounds.  CM met with patient and granddaughter at bedside.  He was on the phone so I asked the granddaughter the assessment questions.  He does not live in one place, he has family and he stays with one for awhile and then goes to someone else.  She states that he travels well, walks by himself and is independent with ADLs.  Well respected elder.      Information Source  Orientation Level: Oriented to place, Oriented to situation, Oriented to person (per family )    Readmission Evaluation  Is this a readmission?: Yes - unplanned readmission    Elopement Risk  Legal Hold: No  Ambulatory or Self Mobile in Wheelchair: Yes  Disoriented: No  Psychiatric Symptoms: None  History of Wandering: No  Elopement this Admit: No  Vocalizing Wanting to Leave: No  Displays Behaviors, Body Language Wanting to Leave: No-Not at Risk for Elopement  Picture of Patient on Inside Chart Front Cover: No (See Comments)  Purple Armband on Patient: No (See Comments)    Interdisciplinary Discharge Planning  Lives with - Patient's Self Care Capacity: Significant Other, Adult Children, Related Adult  Patient or legal guardian wants to designate a caregiver: No  Support Systems: Children  Housing / Facility: [address]  Durable Medical Equipment: Home Oxygen (cane)    Discharge Preparedness  What is your plan after discharge?:  (Home with family)  What are your discharge supports?: Child (adult children and grandchildren)  Prior Functional Level: Independent with Activities of Daily Living  Difficulity with ADLs: None  Difficulity with IADLs: Driving  Difficulity with IADL Comments: family drives him    Functional Assesment  Prior Functional Level: Independent with Activities of Daily Living         Vision / Hearing Impairment  Vision Impairment : Yes  Right Eye Vision: Impaired, Other (Comments)  Left Eye Vision: Impaired, Other (Comments)  Hearing  Impairment : Yes  Hearing Impairment: Both Ears, Hearing Device(s) Available, Other (Comments)  Does Pt Need Special Equipment for the Hearing Impaired?: Yes-But Does not Need for Facility to Arrange Equipment              Domestic Abuse  Have you ever been the victim of abuse or violence?: No  Physical Abuse or Sexual Abuse: No  Verbal Abuse or Emotional Abuse: No  Possible Abuse/Neglect Reported to::

## 2023-11-24 NOTE — CARE PLAN
The patient is Stable - Low risk of patient condition declining or worsening    Shift Goals  Clinical Goals: Hemoglobin will not drop below 7 this shift  Patient Goals: hemodynamic stability, CT abdomen  Family Goals: POC updates, pt comfort and safety    Progress made toward(s) clinical / shift goals: Pt has a bed alarm on, pt has remained free from falls on this admit, pt's hemoglobin has remained above 7 on this admit    Patient is not progressing towards the following goals: Pt continues to have small, frequent melenic stools      Problem: Fall Risk  Goal: Patient will remain free from falls  Outcome: Progressing     Problem: Respiratory  Goal: Patient will achieve/maintain optimum respiratory ventilation and gas exchange  Outcome: Progressing     Problem: Bowel Elimination  Goal: Establish and maintain regular bowel function  Outcome: Not Progressing

## 2023-11-24 NOTE — PROGRESS NOTES
Bedside report received. Pt A&Ox3.POC discussed with pt. Pt verbalizes understanding. Call light and belongings within reach. Bed locked and in lowest position. Alarm and fall precautions in place.

## 2023-11-24 NOTE — PROGRESS NOTES
Hospital Medicine Daily Progress Note    Date of Service  11/24/2023    Chief Complaint  Lewis Farooq is a 89 y.o. male admitted 11/23/2023 with melena     Hospital Course  This is a 89-year-old male with past medical history of COPD (not on home oxygen, home oxygen reading s are about 87% on room air) , CHF (unknown EF), and BPH, pulmonary embolism on Eliquis, choledocholithiasis status post ERCP with stent stone removal on 11/17/2023 by Dr. Ross was transferred from [facility] emergency department on 11/23/2023 where patient presented with anal pain and melena.     Interval Problem Update  Patient seen and examined afebrile, no nausea or vomiting hemoglobin around 11 today   I have consulted GI this morning and discussed the case   Plan is for possible EGD tomorrow   Appreciate rec.     I have discussed this patient's plan of care and discharge plan at IDT rounds today with Case Management, Nursing, Nursing leadership, and other members of the IDT team.    Consultants/Specialty  GI    Code Status  Full Code    Disposition  The patient is not medically cleared for discharge to home or a post-acute facility.      I have placed the appropriate orders for post-discharge needs.    Review of Systems  Review of Systems   Constitutional: Negative.    HENT: Negative.     Eyes: Negative.    Respiratory: Negative.     Cardiovascular: Negative.    Gastrointestinal: Negative.    Genitourinary: Negative.    Musculoskeletal: Negative.    Skin: Negative.    Neurological: Negative.    Endo/Heme/Allergies: Negative.    Psychiatric/Behavioral: Negative.          Physical Exam  Temp:  [36.4 °C (97.5 °F)-37 °C (98.6 °F)] 36.6 °C (97.8 °F)  Pulse:  [51-60] 57  Resp:  [18] 18  BP: ()/(48-69) 137/69  SpO2:  [93 %-97 %] 94 %    Physical Exam  Constitutional:       General: He is not in acute distress.     Appearance: Normal appearance.   HENT:      Head: Normocephalic and atraumatic.      Nose: Nose normal. No  congestion.      Mouth/Throat:      Mouth: Mucous membranes are moist.   Eyes:      Extraocular Movements: Extraocular movements intact.      Pupils: Pupils are equal, round, and reactive to light.   Cardiovascular:      Rate and Rhythm: Normal rate and regular rhythm.      Pulses: Normal pulses.      Heart sounds: Normal heart sounds.   Pulmonary:      Effort: Pulmonary effort is normal.      Breath sounds: Normal breath sounds.   Abdominal:      General: Bowel sounds are normal.      Palpations: Abdomen is soft.      Tenderness: There is no abdominal tenderness.   Musculoskeletal:         General: No swelling. Normal range of motion.      Cervical back: Normal range of motion and neck supple.   Skin:     General: Skin is warm.      Coloration: Skin is not jaundiced.   Neurological:      General: No focal deficit present.      Mental Status: He is alert and oriented to person, place, and time. Mental status is at baseline.      Cranial Nerves: No cranial nerve deficit.   Psychiatric:         Mood and Affect: Mood normal.         Behavior: Behavior normal.         Thought Content: Thought content normal.         Judgment: Judgment normal.         Fluids    Intake/Output Summary (Last 24 hours) at 11/24/2023 1527  Last data filed at 11/24/2023 0900  Gross per 24 hour   Intake 1320 ml   Output --   Net 1320 ml       Laboratory  Recent Labs     11/23/23  1550 11/23/23  2327 11/24/23  0808   WBC 7.9  --  5.4   RBC 3.45*  --  3.58*   HEMOGLOBIN 11.6*  11.2* 11.8* 11.6*   HEMATOCRIT 33.0*  --  34.0*   MCV 95.7  --  95.0   MCH 32.5  --  32.4   MCHC 33.9  --  34.1   RDW 51.0*  --  51.6*   PLATELETCT 213  --  233   MPV 9.8  --  9.7     Recent Labs     11/23/23  1550 11/24/23  0808   SODIUM 139 139   POTASSIUM 4.1 3.9   CHLORIDE 110 108   CO2 20 20   GLUCOSE 107* 97   BUN 13 10   CREATININE 0.69 0.76   CALCIUM 8.4* 8.7                   Imaging  CT-ABDOMEN-PELVIS WITH   Final Result      1.  No acute inflammatory process in  the abdomen or pelvis.   2.  No calcified gallstones or choledocholithiasis.   3.  There is pneumobilia with air in the gallbladder as well as intrahepatic and extra hepatic biliary tree consistent with recent ERCP and sphincterotomy.   4.  No evidence of pancreatitis.   5.  There is colonic diverticulosis without diverticulitis.   6.  There is diffuse rectal wall thickening which could be inflammatory or neoplastic.   7.  Bladder trabeculation and diverticula but no CT evidence of cystitis.   8.  Minimal hazy right middle lobe groundglass opacities could be areas of atelectasis or developing pneumonitis.           Assessment/Plan  * GI bleed- (present on admission)  Assessment & Plan  Presenting to outside facility with anal pain and noted for gross melena  Gi consulted and case discussed   Plan for EGD tomorrow  Cont to monitor hemoglobin and transfuse if below 7     History of pulmonary embolism- (present on admission)  Assessment & Plan  Hold Eliquis in the setting of possible GI bleed  Monitor hemoglobin    Normocytic anemia- (present on admission)  Assessment & Plan  Secondary to GI bleed  Continue IV PPI  Pending iron studies  Frequent H&H  Transfuse less than 7    Hyperlipidemia- (present on admission)  Assessment & Plan  Continue home statin    COPD (chronic obstructive pulmonary disease) (HCC)- (present on admission)  Assessment & Plan  Continue home inhalers  Hold tight feeling for now  RT/O2  Monitor oxygen         VTE prophylaxis: SCD    Greater than 51 minutes spent prepping to see patient (e.g. review of tests) obtaining and/or reviewing separately obtained history. Performing a medically appropriate examination and/ evaluation.  Counseling and educating the patient/family/caregiver.  Ordering medications, tests, or procedures.  Referring and communicating with other health care professionals.  Documenting clinical information in EPIC.  Independently interpreting results and communicating results to  patient/family/caregiver.  Care coordination.      I have performed a physical exam and reviewed and updated ROS and Plan today (11/24/2023). In review of yesterday's note (11/23/2023), there are no changes except as documented above.

## 2023-11-24 NOTE — CARE PLAN
The patient is Watcher - Medium risk of patient condition declining or worsening    Shift Goals  Clinical Goals: trend HGB  Patient Goals: BM    Progress made toward(s) clinical / shift goals:        Problem: Knowledge Deficit - Standard  Goal: Patient and family/care givers will demonstrate understanding of plan of care, disease process/condition, diagnostic tests and medications  Outcome: Progressing  Note: Discussed POC with patient and family answered all current questions.

## 2023-11-24 NOTE — PROGRESS NOTES
Bedside report received from KASHMIR Valentine. Pt on 2 L O2 NC. Patient A&O x 3, disoriented to time. Pt does not complain of pain at this time. POC discussed with patient and family at bedside. Patient and family verbalize understanding. Call light and belongings within reach. Bed locked in lowest position, alarm and fall precautions in place.

## 2023-11-24 NOTE — RESPIRATORY CARE
"COPD EDUCATION by COPD CLINICAL EDUCATOR  11/23/2023 at 5:06 PM by Juanita Lopez, RRT     Patient interviewed by education team. Patient and family deny COPD daignosis at this time.               COPD Assessment  COPD Clinical Specialists ONLY  COPD Education Initiated: Yes--Short Intervention (met with family denies COPD dx but on Advair, Atrovent and oral Theophylline on admit ? Asthma. No PFT to review;unable to perform bedside screen. Recently at [facility] and declined [facility] info in Hebrew at home;quit smoking 50 years ago not on O2)  Is this a COPD exacerbation patient?: No    DME Company: [employer]  DME Equipment Type: 2 lpm O2  (recent Dx of [facility])    PFT Results  No results found for: \"PFT\"    Meds to Beds  [facility] provides bedside medication delivery for all eligible patients at discharge.  Would you like to opt out of this program for any reason?: No - Stay Opted In  "

## 2023-11-25 ENCOUNTER — ANESTHESIA EVENT (OUTPATIENT)
Dept: SURGERY | Facility: MEDICAL CENTER | Age: 88
DRG: 378 | End: 2023-11-25
Payer: COMMERCIAL

## 2023-11-25 ENCOUNTER — ANESTHESIA (OUTPATIENT)
Dept: SURGERY | Facility: MEDICAL CENTER | Age: 88
DRG: 378 | End: 2023-11-25
Payer: COMMERCIAL

## 2023-11-25 LAB
ALBUMIN SERPL BCP-MCNC: 3 G/DL (ref 3.2–4.9)
ANION GAP SERPL CALC-SCNC: 11 MMOL/L (ref 7–16)
BUN SERPL-MCNC: 8 MG/DL (ref 8–22)
CALCIUM ALBUM COR SERPL-MCNC: 9.5 MG/DL (ref 8.5–10.5)
CALCIUM SERPL-MCNC: 8.7 MG/DL (ref 8.5–10.5)
CHLORIDE SERPL-SCNC: 106 MMOL/L (ref 96–112)
CO2 SERPL-SCNC: 22 MMOL/L (ref 20–33)
CREAT SERPL-MCNC: 0.87 MG/DL (ref 0.5–1.4)
EKG IMPRESSION: NORMAL
ERYTHROCYTE [DISTWIDTH] IN BLOOD BY AUTOMATED COUNT: 50.5 FL (ref 35.9–50)
GFR SERPLBLD CREATININE-BSD FMLA CKD-EPI: 81 ML/MIN/1.73 M 2
GLUCOSE SERPL-MCNC: 97 MG/DL (ref 65–99)
HCT VFR BLD AUTO: 34.2 % (ref 42–52)
HGB BLD-MCNC: 11.6 G/DL (ref 14–18)
HGB BLD-MCNC: 12.2 G/DL (ref 14–18)
HGB BLD-MCNC: 13 G/DL (ref 14–18)
MCH RBC QN AUTO: 32 PG (ref 27–33)
MCHC RBC AUTO-ENTMCNC: 33.9 G/DL (ref 32.3–36.5)
MCV RBC AUTO: 94.2 FL (ref 81.4–97.8)
PHOSPHATE SERPL-MCNC: 3.9 MG/DL (ref 2.5–4.5)
PLATELET # BLD AUTO: 237 K/UL (ref 164–446)
PMV BLD AUTO: 10 FL (ref 9–12.9)
POTASSIUM SERPL-SCNC: 3.5 MMOL/L (ref 3.6–5.5)
RBC # BLD AUTO: 3.63 M/UL (ref 4.7–6.1)
SODIUM SERPL-SCNC: 139 MMOL/L (ref 135–145)
WBC # BLD AUTO: 5.9 K/UL (ref 4.8–10.8)

## 2023-11-25 PROCEDURE — 160002 HCHG RECOVERY MINUTES (STAT): Performed by: INTERNAL MEDICINE

## 2023-11-25 PROCEDURE — 160202 HCHG ENDO MINUTES - 1ST 30 MINS LEVEL 3: Performed by: INTERNAL MEDICINE

## 2023-11-25 PROCEDURE — 85027 COMPLETE CBC AUTOMATED: CPT

## 2023-11-25 PROCEDURE — 700102 HCHG RX REV CODE 250 W/ 637 OVERRIDE(OP): Performed by: STUDENT IN AN ORGANIZED HEALTH CARE EDUCATION/TRAINING PROGRAM

## 2023-11-25 PROCEDURE — 700101 HCHG RX REV CODE 250: Performed by: ANESTHESIOLOGY

## 2023-11-25 PROCEDURE — 700102 HCHG RX REV CODE 250 W/ 637 OVERRIDE(OP): Mod: JZ | Performed by: INTERNAL MEDICINE

## 2023-11-25 PROCEDURE — 160035 HCHG PACU - 1ST 60 MINS PHASE I: Performed by: INTERNAL MEDICINE

## 2023-11-25 PROCEDURE — 80069 RENAL FUNCTION PANEL: CPT

## 2023-11-25 PROCEDURE — 0DJD8ZZ INSPECTION OF LOWER INTESTINAL TRACT, VIA NATURAL OR ARTIFICIAL OPENING ENDOSCOPIC: ICD-10-PCS | Performed by: INTERNAL MEDICINE

## 2023-11-25 PROCEDURE — 45330 DIAGNOSTIC SIGMOIDOSCOPY: CPT | Performed by: INTERNAL MEDICINE

## 2023-11-25 PROCEDURE — 43255 EGD CONTROL BLEEDING ANY: CPT | Performed by: INTERNAL MEDICINE

## 2023-11-25 PROCEDURE — 700111 HCHG RX REV CODE 636 W/ 250 OVERRIDE (IP): Performed by: ANESTHESIOLOGY

## 2023-11-25 PROCEDURE — 93005 ELECTROCARDIOGRAM TRACING: CPT | Performed by: INTERNAL MEDICINE

## 2023-11-25 PROCEDURE — 160048 HCHG OR STATISTICAL LEVEL 1-5: Performed by: INTERNAL MEDICINE

## 2023-11-25 PROCEDURE — 85018 HEMOGLOBIN: CPT

## 2023-11-25 PROCEDURE — C9113 INJ PANTOPRAZOLE SODIUM, VIA: HCPCS | Performed by: STUDENT IN AN ORGANIZED HEALTH CARE EDUCATION/TRAINING PROGRAM

## 2023-11-25 PROCEDURE — A9270 NON-COVERED ITEM OR SERVICE: HCPCS | Performed by: STUDENT IN AN ORGANIZED HEALTH CARE EDUCATION/TRAINING PROGRAM

## 2023-11-25 PROCEDURE — 160009 HCHG ANES TIME/MIN: Performed by: INTERNAL MEDICINE

## 2023-11-25 PROCEDURE — 93010 ELECTROCARDIOGRAM REPORT: CPT | Performed by: INTERNAL MEDICINE

## 2023-11-25 PROCEDURE — A9270 NON-COVERED ITEM OR SERVICE: HCPCS | Mod: JZ | Performed by: INTERNAL MEDICINE

## 2023-11-25 PROCEDURE — 700105 HCHG RX REV CODE 258: Performed by: ANESTHESIOLOGY

## 2023-11-25 PROCEDURE — 0DJ08ZZ INSPECTION OF UPPER INTESTINAL TRACT, VIA NATURAL OR ARTIFICIAL OPENING ENDOSCOPIC: ICD-10-PCS | Performed by: INTERNAL MEDICINE

## 2023-11-25 PROCEDURE — 36415 COLL VENOUS BLD VENIPUNCTURE: CPT

## 2023-11-25 PROCEDURE — 770020 HCHG ROOM/CARE - TELE (206)

## 2023-11-25 PROCEDURE — 700111 HCHG RX REV CODE 636 W/ 250 OVERRIDE (IP): Performed by: STUDENT IN AN ORGANIZED HEALTH CARE EDUCATION/TRAINING PROGRAM

## 2023-11-25 PROCEDURE — 99233 SBSQ HOSP IP/OBS HIGH 50: CPT | Performed by: INTERNAL MEDICINE

## 2023-11-25 RX ORDER — SODIUM CHLORIDE, SODIUM LACTATE, POTASSIUM CHLORIDE, CALCIUM CHLORIDE 600; 310; 30; 20 MG/100ML; MG/100ML; MG/100ML; MG/100ML
INJECTION, SOLUTION INTRAVENOUS CONTINUOUS
Status: DISCONTINUED | OUTPATIENT
Start: 2023-11-25 | End: 2023-11-25 | Stop reason: HOSPADM

## 2023-11-25 RX ORDER — LIDOCAINE HYDROCHLORIDE 20 MG/ML
INJECTION, SOLUTION EPIDURAL; INFILTRATION; INTRACAUDAL; PERINEURAL PRN
Status: DISCONTINUED | OUTPATIENT
Start: 2023-11-25 | End: 2023-11-25 | Stop reason: SURG

## 2023-11-25 RX ORDER — POTASSIUM CHLORIDE 20 MEQ/1
40 TABLET, EXTENDED RELEASE ORAL ONCE
Status: COMPLETED | OUTPATIENT
Start: 2023-11-25 | End: 2023-11-25

## 2023-11-25 RX ORDER — OXYCODONE HCL 5 MG/5 ML
5 SOLUTION, ORAL ORAL
Status: DISCONTINUED | OUTPATIENT
Start: 2023-11-25 | End: 2023-11-25 | Stop reason: HOSPADM

## 2023-11-25 RX ORDER — SODIUM CHLORIDE, SODIUM LACTATE, POTASSIUM CHLORIDE, CALCIUM CHLORIDE 600; 310; 30; 20 MG/100ML; MG/100ML; MG/100ML; MG/100ML
INJECTION, SOLUTION INTRAVENOUS
Status: DISCONTINUED | OUTPATIENT
Start: 2023-11-25 | End: 2023-11-25 | Stop reason: SURG

## 2023-11-25 RX ORDER — ONDANSETRON 2 MG/ML
4 INJECTION INTRAMUSCULAR; INTRAVENOUS
Status: DISCONTINUED | OUTPATIENT
Start: 2023-11-25 | End: 2023-11-25 | Stop reason: HOSPADM

## 2023-11-25 RX ORDER — OXYCODONE HCL 5 MG/5 ML
10 SOLUTION, ORAL ORAL
Status: DISCONTINUED | OUTPATIENT
Start: 2023-11-25 | End: 2023-11-25 | Stop reason: HOSPADM

## 2023-11-25 RX ADMIN — PANTOPRAZOLE SODIUM 40 MG: 40 INJECTION, POWDER, FOR SOLUTION INTRAVENOUS at 16:50

## 2023-11-25 RX ADMIN — SODIUM CHLORIDE, POTASSIUM CHLORIDE, SODIUM LACTATE AND CALCIUM CHLORIDE: 600; 310; 30; 20 INJECTION, SOLUTION INTRAVENOUS at 08:14

## 2023-11-25 RX ADMIN — MOMETASONE FUROATE AND FORMOTEROL FUMARATE DIHYDRATE 2 PUFF: 200; 5 AEROSOL RESPIRATORY (INHALATION) at 20:20

## 2023-11-25 RX ADMIN — PROPOFOL 20 MG: 10 INJECTION, EMULSION INTRAVENOUS at 08:22

## 2023-11-25 RX ADMIN — DOCUSATE SODIUM 50 MG AND SENNOSIDES 8.6 MG 2 TABLET: 8.6; 5 TABLET, FILM COATED ORAL at 05:03

## 2023-11-25 RX ADMIN — APIXABAN 5 MG: 5 TABLET, FILM COATED ORAL at 16:50

## 2023-11-25 RX ADMIN — PANTOPRAZOLE SODIUM 40 MG: 40 INJECTION, POWDER, FOR SOLUTION INTRAVENOUS at 05:03

## 2023-11-25 RX ADMIN — POTASSIUM CHLORIDE 40 MEQ: 1500 TABLET, EXTENDED RELEASE ORAL at 09:57

## 2023-11-25 RX ADMIN — PROPOFOL 20 MG: 10 INJECTION, EMULSION INTRAVENOUS at 08:20

## 2023-11-25 RX ADMIN — MOMETASONE FUROATE AND FORMOTEROL FUMARATE DIHYDRATE 2 PUFF: 200; 5 AEROSOL RESPIRATORY (INHALATION) at 09:59

## 2023-11-25 RX ADMIN — PROPOFOL 50 MG: 10 INJECTION, EMULSION INTRAVENOUS at 08:17

## 2023-11-25 RX ADMIN — PROPOFOL 20 MG: 10 INJECTION, EMULSION INTRAVENOUS at 08:25

## 2023-11-25 RX ADMIN — LIDOCAINE HYDROCHLORIDE 30 MG: 20 INJECTION, SOLUTION EPIDURAL; INFILTRATION; INTRACAUDAL at 08:17

## 2023-11-25 RX ADMIN — ATORVASTATIN CALCIUM 20 MG: 20 TABLET, FILM COATED ORAL at 20:20

## 2023-11-25 ASSESSMENT — ENCOUNTER SYMPTOMS
CONSTITUTIONAL NEGATIVE: 1
PSYCHIATRIC NEGATIVE: 1
GASTROINTESTINAL NEGATIVE: 1
MUSCULOSKELETAL NEGATIVE: 1
RESPIRATORY NEGATIVE: 1
EYES NEGATIVE: 1
CARDIOVASCULAR NEGATIVE: 1
NEUROLOGICAL NEGATIVE: 1

## 2023-11-25 ASSESSMENT — PAIN DESCRIPTION - PAIN TYPE
TYPE: ACUTE PAIN

## 2023-11-25 ASSESSMENT — COGNITIVE AND FUNCTIONAL STATUS - GENERAL
WALKING IN HOSPITAL ROOM: A LITTLE
DAILY ACTIVITIY SCORE: 22
SUGGESTED CMS G CODE MODIFIER DAILY ACTIVITY: CJ
DRESSING REGULAR LOWER BODY CLOTHING: A LITTLE
HELP NEEDED FOR BATHING: A LITTLE
MOBILITY SCORE: 22
CLIMB 3 TO 5 STEPS WITH RAILING: A LITTLE
SUGGESTED CMS G CODE MODIFIER MOBILITY: CJ

## 2023-11-25 ASSESSMENT — FIBROSIS 4 INDEX: FIB4 SCORE: 1.6

## 2023-11-25 ASSESSMENT — PAIN SCALES - GENERAL: PAIN_LEVEL: 0

## 2023-11-25 NOTE — PROGRESS NOTES
Pt returned to the floor and placed on tele monitor. Monitor room notified. Post-op vitals in place. Orders released and reviewed. Fall precautions in place. Call light in place. RN to round frequently.

## 2023-11-25 NOTE — PROGRESS NOTES
Hospital Medicine Daily Progress Note    Date of Service  11/25/2023    Chief Complaint  Lewis Farooq is a 89 y.o. male admitted 11/23/2023 with melena     Hospital Course  This is a 89-year-old male with past medical history of COPD (not on home oxygen, home oxygen reading s are about 87% on room air) , CHF (unknown EF), and BPH, pulmonary embolism on Eliquis, choledocholithiasis status post ERCP with stent stone removal on 11/17/2023 by Dr. Ross was transferred from [facility] emergency department on 11/23/2023 where patient presented with anal pain and melena.     Interval Problem Update  Patient seen and examined afebrile, no nausea or vomiting hemoglobin around 11 today   I have consulted GI this morning and discussed the case   Plan is for possible EGD tomorrow   Appreciate rec.   11/25; Patient seen and examined, GI following had EGD today case discussed no bleeding seen on EGD   Will try to restart eliquis today and will monitor hemoglobin   I have discussed this patient's plan of care and discharge plan at IDT rounds today with Case Management, Nursing, Nursing leadership, and other members of the IDT team.    Consultants/Specialty  GI    Code Status  Full Code    Disposition  The patient is not medically cleared for discharge to home or a post-acute facility.      I have placed the appropriate orders for post-discharge needs.    Review of Systems  Review of Systems   Constitutional: Negative.    HENT: Negative.     Eyes: Negative.    Respiratory: Negative.     Cardiovascular: Negative.    Gastrointestinal: Negative.    Genitourinary: Negative.    Musculoskeletal: Negative.    Skin: Negative.    Neurological: Negative.    Endo/Heme/Allergies: Negative.    Psychiatric/Behavioral: Negative.          Physical Exam  Temp:  [36.3 °C (97.4 °F)-37.4 °C (99.3 °F)] 37.1 °C (98.7 °F)  Pulse:  [50-98] 62  Resp:  [16-21] 16  BP: (108-144)/(45-88) 119/45  SpO2:  [90 %-97 %] 97 %    Physical  Exam  Constitutional:       General: He is not in acute distress.     Appearance: Normal appearance.   HENT:      Head: Normocephalic and atraumatic.      Nose: Nose normal. No congestion.      Mouth/Throat:      Mouth: Mucous membranes are moist.   Eyes:      Extraocular Movements: Extraocular movements intact.      Pupils: Pupils are equal, round, and reactive to light.   Cardiovascular:      Rate and Rhythm: Normal rate and regular rhythm.      Pulses: Normal pulses.      Heart sounds: Normal heart sounds.   Pulmonary:      Effort: Pulmonary effort is normal.      Breath sounds: Normal breath sounds.   Abdominal:      General: Bowel sounds are normal.      Palpations: Abdomen is soft.      Tenderness: There is no abdominal tenderness.   Musculoskeletal:         General: No swelling. Normal range of motion.      Cervical back: Normal range of motion and neck supple.   Skin:     General: Skin is warm.      Coloration: Skin is not jaundiced.   Neurological:      General: No focal deficit present.      Mental Status: He is alert and oriented to person, place, and time. Mental status is at baseline.      Cranial Nerves: No cranial nerve deficit.   Psychiatric:         Mood and Affect: Mood normal.         Behavior: Behavior normal.         Thought Content: Thought content normal.         Judgment: Judgment normal.         Fluids    Intake/Output Summary (Last 24 hours) at 11/25/2023 1446  Last data filed at 11/25/2023 0959  Gross per 24 hour   Intake 1060 ml   Output --   Net 1060 ml       Laboratory  Recent Labs     11/23/23  1550 11/23/23  2327 11/24/23  0808 11/24/23  1548 11/25/23  0015 11/25/23  1010   WBC 7.9  --  5.4  --  5.9  --    RBC 3.45*  --  3.58*  --  3.63*  --    HEMOGLOBIN 11.6*  11.2*   < > 11.6* 11.9* 11.6* 13.0*   HEMATOCRIT 33.0*  --  34.0*  --  34.2*  --    MCV 95.7  --  95.0  --  94.2  --    MCH 32.5  --  32.4  --  32.0  --    MCHC 33.9  --  34.1  --  33.9  --    RDW 51.0*  --  51.6*  --  50.5*   --    PLATELETCT 213  --  233  --  237  --    MPV 9.8  --  9.7  --  10.0  --     < > = values in this interval not displayed.     Recent Labs     11/23/23  1550 11/24/23  0808 11/25/23  0015   SODIUM 139 139 139   POTASSIUM 4.1 3.9 3.5*   CHLORIDE 110 108 106   CO2 20 20 22   GLUCOSE 107* 97 97   BUN 13 10 8   CREATININE 0.69 0.76 0.87   CALCIUM 8.4* 8.7 8.7                   Imaging  CT-ABDOMEN-PELVIS WITH   Final Result      1.  No acute inflammatory process in the abdomen or pelvis.   2.  No calcified gallstones or choledocholithiasis.   3.  There is pneumobilia with air in the gallbladder as well as intrahepatic and extra hepatic biliary tree consistent with recent ERCP and sphincterotomy.   4.  No evidence of pancreatitis.   5.  There is colonic diverticulosis without diverticulitis.   6.  There is diffuse rectal wall thickening which could be inflammatory or neoplastic.   7.  Bladder trabeculation and diverticula but no CT evidence of cystitis.   8.  Minimal hazy right middle lobe groundglass opacities could be areas of atelectasis or developing pneumonitis.           Assessment/Plan  * GI bleed- (present on admission)  Assessment & Plan  Presenting to outside facility with anal pain and noted for gross melena  Gi consulted and case discussed   Plan for EGD tomorrow  Cont to monitor hemoglobin and transfuse if below 7     History of pulmonary embolism- (present on admission)  Assessment & Plan  Hold Eliquis in the setting of possible GI bleed  Monitor hemoglobin    Normocytic anemia- (present on admission)  Assessment & Plan  Secondary to GI bleed  Continue IV PPI  Pending iron studies  Frequent H&H  Transfuse less than 7    Hyperlipidemia- (present on admission)  Assessment & Plan  Continue home statin    COPD (chronic obstructive pulmonary disease) (HCC)- (present on admission)  Assessment & Plan  Continue home inhalers  Hold tight feeling for now  RT/O2  Monitor oxygen         VTE prophylaxis:  eliquis    Greater than 51 minutes spent prepping to see patient (e.g. review of tests) obtaining and/or reviewing separately obtained history. Performing a medically appropriate examination and/ evaluation.  Counseling and educating the patient/family/caregiver.  Ordering medications, tests, or procedures.  Referring and communicating with other health care professionals.  Documenting clinical information in EPIC.  Independently interpreting results and communicating results to patient/family/caregiver.  Care coordination.     I have performed a physical exam and reviewed and updated ROS and Plan today (11/25/2023). In review of yesterday's note (11/24/2023), there are no changes except as documented above.

## 2023-11-25 NOTE — PROCEDURES
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   Grossly normal duodenum up to 4th portion, no evidence of bleeding from biliary system/ampulla vater.     PROCEDURE:  ESOPHAGOGASTRODUODENOSCOPY    PHYSICIAN:  Amy Johnson MD. MPH.    ANESTHESIA:  Per anesthesiologist or ICU team.     LOCATION: [facility]    CONSENT:  OBTAINED. The risks, benefits and alternatives of the procedure were discussed in details. The risks include and are not limited to bleeding, infection, perforation, missed lesions, and sedations risks (cardiopulmonary compromise and allergic reaction to medications).    DESCRIPTION: The patient presented to the procedure room.  After routine checkup was performed, patient was brought into the endoscopy suite.  Patient was placed on his left lateral decubitus position. A bite block was placed in patient's mouth. Patient was sedated by anesthesia.  Vital signs were monitored throughout the procedure.  Oxygenation support was provided throughout the procedure. Upper endoscope was inserted into patient's mouth and advanced to the 4th portion of the duodenum under direct visualization.      Once the site was reached and examined, the upper endoscope was withdrawn.  Retroflexion was made within the stomach.  The stomach was decompressed, scope was withdrawn and the procedure was terminated.    The patient tolerated the procedure well.  There were no immediate complications.    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   Grossly normal duodenum up to 4th portion, no evidence of bleeding from biliary system/ampulla vater.      RECOMMENDATIONS:  Okay to resume oral diet.   Change PPI to oral dose, bid dose for now and daily dose while discharge.   Could not predict if he will rebleed if resuming anti-coag, he might have more AVMs in small bowel or colon.       This note has been transcribed with digital voice recognition software and although it has been reviewed may contain grammatical or word errors

## 2023-11-25 NOTE — CARE PLAN
The patient is Stable - Low risk of patient condition declining or worsening    Shift Goals  Clinical Goals: monitor VS, monitor Hgb  Patient Goals: rest, comfort  Family Goals: updates    Progress made toward(s) clinical / shift goals:    Problem: Knowledge Deficit - Standard  Goal: Patient and family/care givers will demonstrate understanding of plan of care, disease process/condition, diagnostic tests and medications  Outcome: Progressing  Note: Pt updated on POC, medications, and treatment plans. Pt verbalizes understanding, all questions answered.      Problem: Fall Risk  Goal: Patient will remain free from falls  Outcome: Progressing  Note: Pt has remained free from falls throughout the shift. Pt refusing bed alarm, note written and charge not notified.      Problem: Bowel Elimination  Goal: Establish and maintain regular bowel function  Outcome: Progressing

## 2023-11-25 NOTE — PROGRESS NOTES
Monitor Summary  Rhythm: SB/SR  Rate: 52-77  Ectopy: rPAC, rbigiminy, rPVC  .20 / .06 / .44

## 2023-11-25 NOTE — PROGRESS NOTES
Monitor Summary  Rhythm: SB-SR  Rate: 51-77  Ectopy: oPVC, trigem, coup, rPAC  .22 / .05 / .34

## 2023-11-25 NOTE — ANESTHESIA POSTPROCEDURE EVALUATION
Patient: Lewis Farooq    Procedure Summary       Date: 11/25/23 Room / Location: [facility] OR 07 / SURGERY [facility]    Anesthesia Start: 0814 Anesthesia Stop:     Procedures:       GASTROSCOPY (Esophagus)      COLONOSCOPY (Anus)      EGD, WITH CLIP PLACEMENT (Esophagus) Diagnosis: (AVM)    Surgeons: Amy Johnson M.D. Responsible Provider: Julio Major M.D.    Anesthesia Type: general ASA Status: 3            Final Anesthesia Type: general  Last vitals  BP   Blood Pressure : (!) 144/69    Temp   36.6 °C (97.9 °F)    Pulse   (!) 59   Resp   (!) 21    SpO2   91 %      Anesthesia Post Evaluation    Patient location during evaluation: PACU  Patient participation: complete - patient participated  Level of consciousness: awake and alert  Pain score: 0    Airway patency: patent  Anesthetic complications: no  Cardiovascular status: hemodynamically stable  Respiratory status: face mask    PONV: none          No notable events documented.     Nurse Pain Score: 0 (NPRS)

## 2023-11-25 NOTE — ANESTHESIA PREPROCEDURE EVALUATION
" Case: [id number] Date/Time: 11/25/23 0758    Procedures:       GASTROSCOPY (Esophagus)      COLONOSCOPY (Anus)    Anesthesia type: MAC    Pre-op diagnosis: melena, gi bleed    Location: [place] [address] / SURGERY [facility]    Surgeons: Amy Johnson M.D.            Relevant Problems   PULMONARY   (positive) COPD (chronic obstructive pulmonary disease) (HCC)     Gi bleeding.    BP (!) 144/69   Pulse (!) 59   Temp 36.6 °C (97.9 °F) (Temporal)   Resp (!) 21   Ht 1.626 m (5' 4\")   Wt 67.1 kg (148 lb)   SpO2 91%   BMI 25.40 kg/m²       Physical Exam    Airway   Mallampati: II  TM distance: >3 FB  Neck ROM: full       Cardiovascular - normal exam  Rhythm: regular  Rate: normal  (-) murmur     Dental - normal exam      Very poor dentition     Pulmonary - normal exam  Breath sounds clear to auscultation     Abdominal    Neurological - normal exam                   Anesthesia Plan    ASA 3   ASA physical status 3 criteria: COPD - poorly controlled    Plan - general       Airway plan will be natural airway          Induction: intravenous    Postoperative Plan: Postoperative administration of opioids is intended.    Pertinent diagnostic labs and testing reviewed    Informed Consent:    Anesthetic plan and risks discussed with patient.    Use of blood products discussed with: patient whom consented to blood products.           "

## 2023-11-25 NOTE — ANESTHESIA TIME REPORT
Anesthesia Start and Stop Event Times       Date Time Event    11/25/2023 0812 Ready for Procedure     0814 Anesthesia Start     0831 Anesthesia Stop          Responsible Staff  11/25/23      Name Role Begin End    Julio Major M.D. Anesth 0814 0831          Overtime Reason:  no overtime (within assigned shift)    Comments:

## 2023-11-25 NOTE — PROGRESS NOTES
Patient family bedside throughout stay. Patient up to bathroom frequently. Patient and family refusing bed alarm. Patient agreeable to allow to assist. Family agreeable to call for nursing help if patient feels weak or dizzy. Educated on protocol and possibility of fall. Family and patient verbalize understanding. Charge informed. Charge discussed fall risk with family and patient. Bed alarm unplugged.

## 2023-11-25 NOTE — THERAPY
Physical Therapy Contact Note    Patient Name: Lewis Farooq  Age:  89 y.o., Sex:  male  Medical Record #: [id number]  Today's Date: 11/25/2023    PT orders received. Pt getting EGD this am. Will re-attempt as appropriate.    Ambrose Bran PT, DPT

## 2023-11-25 NOTE — PROCEDURES
OPERATIVE REPORT        PATIENT: Lewis Farooq  12/31/1930      PREOPERATIVE DIAGNOSIS/INDICATION: ?anal trauma.     POSTOPERATIVE DIAGNOSES:   No definite trauma (tear, erosion, ulcer, etc) at anus.   Mild hemorrhoid, not bleeding.   Scope up to distal sigmoid, yellow stool was seen, no other major pathology.     PROCEDURE: Flexible Sigmoidoscope.     PHYSICIAN: Chris Johnson MD MPH.     CONSENT:  OBTAINED. The risks, benefits and alternatives of the procedure were discussed in details. The risks include and are not limited to bleeding, infection, perforation, missed lesions, and sedations risks (cardiopulmonary compromise and allergic reaction to medications).    ANESTHESIA:  Per anesthesiologist.    LOCATION: [facility]    DESCRIPTION:  The patient presented to the procedure room.  After routine checkup was performed, patient was brought into endoscopy suite.  Patient was placed on his left lateral decubitus position.  Patient was sedated by anesthesia. Vital signs were monitored throughout procedure.  Oxygenation support was provided throughout procedure. Digital rectal examination was performed.  Then, a colonoscope/EGD was inserted into patient's anus, advanced to the junction of sigmoid/rectum.       No definite trauma (tear, erosion, ulcer, etc) at anus.   Mild hemorrhoid, not bleeding.   Scope up to distal sigmoid, yellow stool was seen, no other major pathology.     IMPRESSION:RECOMMENDATIONS:  No overt trauma or bleeding.   Yellow stool in rectum/sigmoid.     Gi will signs off for now, please call us back if rebleeds.     This note has been transcribed with digital voice recognition software and although it has been reviewed may contain grammatical or word errors

## 2023-11-25 NOTE — CARE PLAN
The patient is Stable - Low risk of patient condition declining or worsening    Shift Goals  Clinical Goals: monitor for bleeding, monitor hgb  Patient Goals: rest and comfort  Family Goals: updates    Progress made toward(s) clinical / shift goals:        Problem: Knowledge Deficit - Standard  Goal: Patient and family/care givers will demonstrate understanding of plan of care, disease process/condition, diagnostic tests and medications  Outcome: Progressing  Note: Discussed POC with patient and family. Answered all current questions.      Problem: Bowel Elimination  Goal: Establish and maintain regular bowel function  Outcome: Progressing  Flowsheets (Taken 11/25/2023 0700)  Last BM: 11/25/23  Note: Patient having loose stool currently. Discussed bowel management with family and patient.

## 2023-11-25 NOTE — PROGRESS NOTES
Bedside report received from day RN Linsey, pt care assumed, assessment completed. Pt is A&Ox3, pain 0/10, SB/SR on the monitor. Updated on POC, questions answered. Bed in lowest, locked position, treaded socks on, call light and belongings within reach. Fall precautions in place.

## 2023-11-26 ENCOUNTER — PHARMACY VISIT (OUTPATIENT)
Dept: PHARMACY | Facility: MEDICAL CENTER | Age: 88
End: 2023-11-26
Payer: COMMERCIAL

## 2023-11-26 VITALS
BODY MASS INDEX: 24.59 KG/M2 | TEMPERATURE: 96.8 F | HEIGHT: 64 IN | WEIGHT: 144 LBS | DIASTOLIC BLOOD PRESSURE: 61 MMHG | RESPIRATION RATE: 17 BRPM | SYSTOLIC BLOOD PRESSURE: 98 MMHG | OXYGEN SATURATION: 93 % | HEART RATE: 58 BPM

## 2023-11-26 LAB
ALBUMIN SERPL BCP-MCNC: 3.1 G/DL (ref 3.2–4.9)
ANION GAP SERPL CALC-SCNC: 12 MMOL/L (ref 7–16)
BUN SERPL-MCNC: 11 MG/DL (ref 8–22)
CALCIUM ALBUM COR SERPL-MCNC: 9.3 MG/DL (ref 8.5–10.5)
CALCIUM SERPL-MCNC: 8.6 MG/DL (ref 8.5–10.5)
CHLORIDE SERPL-SCNC: 109 MMOL/L (ref 96–112)
CO2 SERPL-SCNC: 20 MMOL/L (ref 20–33)
CREAT SERPL-MCNC: 1.1 MG/DL (ref 0.5–1.4)
ERYTHROCYTE [DISTWIDTH] IN BLOOD BY AUTOMATED COUNT: 49.7 FL (ref 35.9–50)
GFR SERPLBLD CREATININE-BSD FMLA CKD-EPI: 63 ML/MIN/1.73 M 2
GLUCOSE SERPL-MCNC: 124 MG/DL (ref 65–99)
HCT VFR BLD AUTO: 35.1 % (ref 42–52)
HGB BLD-MCNC: 12 G/DL (ref 14–18)
HGB BLD-MCNC: 13.1 G/DL (ref 14–18)
MCH RBC QN AUTO: 32.2 PG (ref 27–33)
MCHC RBC AUTO-ENTMCNC: 34.2 G/DL (ref 32.3–36.5)
MCV RBC AUTO: 94.1 FL (ref 81.4–97.8)
PHOSPHATE SERPL-MCNC: 4.2 MG/DL (ref 2.5–4.5)
PLATELET # BLD AUTO: 253 K/UL (ref 164–446)
PMV BLD AUTO: 9.9 FL (ref 9–12.9)
POTASSIUM SERPL-SCNC: 3.8 MMOL/L (ref 3.6–5.5)
RBC # BLD AUTO: 3.73 M/UL (ref 4.7–6.1)
SODIUM SERPL-SCNC: 141 MMOL/L (ref 135–145)
WBC # BLD AUTO: 6.5 K/UL (ref 4.8–10.8)

## 2023-11-26 PROCEDURE — 99239 HOSP IP/OBS DSCHRG MGMT >30: CPT | Performed by: INTERNAL MEDICINE

## 2023-11-26 PROCEDURE — 85018 HEMOGLOBIN: CPT

## 2023-11-26 PROCEDURE — 700102 HCHG RX REV CODE 250 W/ 637 OVERRIDE(OP): Performed by: STUDENT IN AN ORGANIZED HEALTH CARE EDUCATION/TRAINING PROGRAM

## 2023-11-26 PROCEDURE — A9270 NON-COVERED ITEM OR SERVICE: HCPCS | Performed by: STUDENT IN AN ORGANIZED HEALTH CARE EDUCATION/TRAINING PROGRAM

## 2023-11-26 PROCEDURE — 700111 HCHG RX REV CODE 636 W/ 250 OVERRIDE (IP): Performed by: STUDENT IN AN ORGANIZED HEALTH CARE EDUCATION/TRAINING PROGRAM

## 2023-11-26 PROCEDURE — C9113 INJ PANTOPRAZOLE SODIUM, VIA: HCPCS | Performed by: STUDENT IN AN ORGANIZED HEALTH CARE EDUCATION/TRAINING PROGRAM

## 2023-11-26 PROCEDURE — 36415 COLL VENOUS BLD VENIPUNCTURE: CPT

## 2023-11-26 RX ORDER — OMEPRAZOLE 20 MG/1
20 CAPSULE, DELAYED RELEASE ORAL 2 TIMES DAILY
Qty: 60 CAPSULE | Refills: 0 | Status: SHIPPED | OUTPATIENT
Start: 2023-11-26

## 2023-11-26 RX ADMIN — PANTOPRAZOLE SODIUM 40 MG: 40 INJECTION, POWDER, FOR SOLUTION INTRAVENOUS at 04:55

## 2023-11-26 RX ADMIN — MOMETASONE FUROATE AND FORMOTEROL FUMARATE DIHYDRATE 2 PUFF: 200; 5 AEROSOL RESPIRATORY (INHALATION) at 09:48

## 2023-11-26 RX ADMIN — APIXABAN 5 MG: 5 TABLET, FILM COATED ORAL at 04:55

## 2023-11-26 ASSESSMENT — FIBROSIS 4 INDEX: FIB4 SCORE: 1.5

## 2023-11-26 ASSESSMENT — PAIN DESCRIPTION - PAIN TYPE: TYPE: ACUTE PAIN

## 2023-11-26 NOTE — PROGRESS NOTES
Assumed care of patient, bedside report received from BRIAN RN. Updated on POC, call light within reach and fall precautions in place. Bed locked and in lowest position. Patient instructed to call for assistance before getting out of bed. All questions answered, no other needs at this time.

## 2023-11-26 NOTE — PROGRESS NOTES
Monitor Summary    SB/SR with 50s-70s  Ectopy: Bigem/Trigem/couplet PVCs  .22/.12/.59      0610: Per Monitor tech, CA re measured at 0.2

## 2023-11-26 NOTE — PROGRESS NOTES
Patient discharged home. Patient AOX 3.  IV and tele box removed, discharge instructions provided to patient and reviewed with them. All questions answered, patient provided copy of discharge instructions and instructed on when to F/U with MD. Patient wheeled off unit. Patient discharged into the care of daughter.

## 2023-11-26 NOTE — CARE PLAN
The patient is Watcher - Medium risk of patient condition declining or worsening    Shift Goals  Clinical Goals: Monitor H&H  Patient Goals: Rest and comfort  Family Goals: updates    Progress made toward(s) clinical / shift goals:      Problem: Knowledge Deficit - Standard  Goal: Patient and family/care givers will demonstrate understanding of plan of care, disease process/condition, diagnostic tests and medications  Outcome: Progressing     Problem: Fall Risk  Goal: Patient will remain free from falls  Outcome: Progressing     Problem: Hemodynamics  Goal: Patient's hemodynamics, fluid balance and neurologic status will be stable or improve  Outcome: Progressing       Patient is not progressing towards the following goals:

## 2023-11-26 NOTE — DISCHARGE SUMMARY
Discharge Summary    CHIEF COMPLAINT ON ADMISSION  No chief complaint on file.      Reason for Admission  Upper GI Bleed     Admission Date  11/23/2023    CODE STATUS  Full Code    HPI & HOSPITAL COURSE  This is a 89 y.o. male with past medical history of COPD (not on home oxygen, home oxygen reading s are about 87% on room air) , CHF (unknown EF), and BPH, pulmonary embolism on Eliquis, choledocholithiasis status post ERCP with stent stone removal on 11/17/2023 by Dr. Ross was transferred from [facility] emergency department on 11/23/2023 where patient presented with anal pain and melena.   Patient was admitted for further work up and treatment. GI was consulted and patient had EGD done and sigmoidoscopy but did not show any bleeding.  Patient has been restarted back on eliquis. His hemoglobin has remained stable.  Will discharge patient home today       The patient met 2-midnight criteria for an inpatient stay at the time of discharge.    Discharge Date  11/26/2023    FOLLOW UP ITEMS POST DISCHARGE  GI     DISCHARGE DIAGNOSES  Principal Problem:    GI bleed (POA: Yes)  Active Problems:    COPD (chronic obstructive pulmonary disease) (HCC) (POA: Yes)    Hyperlipidemia (POA: Yes)    Normocytic anemia (POA: Yes)    History of pulmonary embolism (POA: Yes)  Resolved Problems:    * No resolved hospital problems. *      FOLLOW UP  No future appointments.  PCP  call to make an ngozi to follow up with PCP in the next week  Call        MEDICATIONS ON DISCHARGE     Medication List        CONTINUE taking these medications        Instructions   atorvastatin 20 MG Tabs  Commonly known as: Lipitor   Take 20 mg by mouth every evening.  Dose: 20 mg     Atrovent HFA 17 MCG/ACT Aers  Generic drug: ipratropium   Inhale 2 Puffs every 6 hours as needed (Shortness of breath).  Dose: 2 Puff     Eliquis 5mg Tabs  Generic drug: apixaban   Take 5 mg by mouth 2 times a day.  Dose: 5 mg     fluticasone-salmeterol 250-50 MCG/ACT  Aepb  Commonly known as: Advair   Inhale 1 Puff 2 times a day.  Dose: 1 Puff     magnesium hydroxide 400 MG/5ML Susp  Commonly known as: Milk Of Magnesia   Take 30 mL by mouth every day.  Dose: 30 mL     omeprazole 20 MG delayed-release capsule  Commonly known as: PriLOSEC   [place] 1 cápsula por vía oral 2 veces al día.  (Take 1 Capsule by mouth 2 times a day.)  Dose: 20 mg     polyethylene glycol/lytes Pack  Commonly known as: Miralax   Take 17 g by mouth every day.  Dose: 17 g     theophylline 100 MG SR capsule  Commonly known as: Kerwin-24   Take 100 mg by mouth every day. Medication from [place] (Teofilina)  Dose: 100 mg     tramadol-acetaminophen 37.5-325 MG per tablet  Commonly known as: Ultracet   Take 1 Tablet by mouth 2 times a day as needed. Indications: Acute Pain  Dose: 1 Tablet              Allergies  Not on File    DIET  Orders Placed This Encounter   Procedures    Diet Order Diet: Low Fiber(GI Soft)     Standing Status:   Standing     Number of Occurrences:   1     Order Specific Question:   Diet:     Answer:   Low Fiber(GI Soft) [2]       ACTIVITY  As tolerated.  Weight bearing as tolerated    CONSULTATIONS  GI     PROCEDURES  EGD and sigmoidoscopy     LABORATORY  Lab Results   Component Value Date    SODIUM 141 11/25/2023    POTASSIUM 3.8 11/25/2023    CHLORIDE 109 11/25/2023    CO2 20 11/25/2023    GLUCOSE 124 (H) 11/25/2023    BUN 11 11/25/2023    CREATININE 1.10 11/25/2023        Lab Results   Component Value Date    WBC 6.5 11/25/2023    HEMOGLOBIN 13.1 (L) 11/26/2023    HEMATOCRIT 35.1 (L) 11/25/2023    PLATELETCT 253 11/25/2023        Total time of the discharge process exceeds 35 minutes.

## 2023-12-07 NOTE — DOCUMENTATION QUERY
[facility]                                                                       Query Response Note      PATIENT:               AYE TUCKER  ACCT #:                  [id number]  MRN:                     [id number]  :                      1930  ADMIT DATE:       2023 2:24 PM  DISCH DATE:        2023 1:57 PM  RESPONDING  PROVIDER #:        [id number]           QUERY TEXT:    Please clarify the relationship, if any, between GI bleed and Eliquis use.          The patient's Clinical Indicators include:  89-year-old male admitted with Melena and anemia secondary to GI bleed.     Consult: Patient presented to inpatient GI service for melena in the last 2 days while he was taking Eliquis. Agree to hold blood thinner if relatively safe.     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      DC summary: GI was consulted and patient had EGD done and sigmoidoscopy but did not show any bleeding    Risk Factors: On eliquis for PE    Treatment: Eliquis held, EGD, Flexible Sigmoidoscope, PPI    Thank you,  [email or web address]  Options provided:   -- GI bleed is due to/associated with use of Eliquis  (hemorrhagic disorder due to circulating anticoagulant)   -- GI bleed is not due to/associated with use of Eliquis   -- Other explanation, (please specify other explanation)   -- Unable to determine      Query created by: Rina Gupta on 2023 11:22 AM    RESPONSE TEXT:    Unable to determine          Electronically signed by:  FRANCY ROD MD 2023 6:58 AM

## (undated) DEVICE — BLOCK BITE ENDOSCOPIC 2809 - (100/BX) INTERMEDIATE

## (undated) DEVICE — SPHINCTEROTOME CLEVER CUT

## (undated) DEVICE — ELECTRODE DUAL RETURN W/ CORD - (50/PK)

## (undated) DEVICE — SENSOR OXIMETER ADULT SPO2 RD SET (20EA/BX)

## (undated) DEVICE — SYRINGE SAFETY 10 ML 18 GA X 1 1/2 BLUNT LL (100/BX 4BX/CA)

## (undated) DEVICE — SYRINGE 12 CC LUER TIP - (80/BX) OBSOLETE ITEM

## (undated) DEVICE — KIT  I.V. START (100EA/CA)

## (undated) DEVICE — KIT CUSTOM PROCEDURE SINGLE FOR ENDO  (15/CA)

## (undated) DEVICE — MASK WITH FACE SHIELD (25/BX 4BX/CA)

## (undated) DEVICE — Device

## (undated) DEVICE — GUIDEWIRE .025X450CM JAGWIRE REVOLUTION (2EA/BX)

## (undated) DEVICE — COVER LIGHT HANDLE FLEXIBLE - SOFT (2EA/PK 80PK/CA)

## (undated) DEVICE — TUBE SUCTION YANKAUER  1/4 X 6FT (20EA/CA)"

## (undated) DEVICE — EXTRACTOR PRO XL 9-12 MM ABOVE

## (undated) DEVICE — PORT AUXILLARY WATER (50EA/BX)

## (undated) DEVICE — CANISTER SUCTION RIGID RED 1500CC (40EA/CA)

## (undated) DEVICE — SYRINGE DISP. 60 CC LL - (30/BX, 12BX/CA)**WHEN THESE ARE GONE ORDER #500206**

## (undated) DEVICE — SYRINGE SAFETY 3 ML 18 GA X 1 1/2 BLUNT LL (100/BX 8BX/CA)

## (undated) DEVICE — BUTTON ENDOSCOPY DISPOSABLE

## (undated) DEVICE — SPONGE GAUZE NON-STERILE 4X4 - (2000/CA 10PK/CA)

## (undated) DEVICE — LACTATED RINGERS INJ 1000 ML - (14EA/CA 60CA/PF)

## (undated) DEVICE — SET LEADWIRE 5 LEAD BEDSIDE DISPOSABLE ECG (1SET OF 5/EA)

## (undated) DEVICE — WATER IRRIGATION STERILE 1000ML (12EA/CA)

## (undated) DEVICE — MASK OXYGEN VNYL ADLT MED CONC WITH 7 FOOT TUBING  - (50EA/CA)

## (undated) DEVICE — DRAPE X LONG COILED INSTRUMENT ORGANIZER EUS (20EA/BX)

## (undated) DEVICE — TUBING CLEARLINK DUO-VENT - C-FLO (48EA/CA)

## (undated) DEVICE — FILM CASSETTE ENDO

## (undated) DEVICE — MASK PANORAMIC OXYGEN PRO2 (30EA/CA)

## (undated) DEVICE — TOWEL STOP TIMEOUT SAFETY FLAG (40EA/CA)

## (undated) DEVICE — GOWN SURGEONS LARGE - (32/CA)

## (undated) DEVICE — SYRINGE SAFETY 5 ML 18 GA X 1-1/2 BLUNT LL (100/BX 4BX/CA)

## (undated) DEVICE — FORCEP RADIAL JAW 4 STANDARD CAPACITY W/NEEDLE 240CM (40EA/BX)

## (undated) DEVICE — KIT PROCEDURE DOUBLE ENDO ONLY (5/CA)

## (undated) DEVICE — NEPTUNE 4 PORT MANIFOLD - (20/PK)

## (undated) DEVICE — TUBE CONNECTING SUCTION - CLEAR PLASTIC STERILE 72 IN (50EA/CA)